# Patient Record
Sex: MALE | Race: WHITE | NOT HISPANIC OR LATINO | Employment: FULL TIME | URBAN - METROPOLITAN AREA
[De-identification: names, ages, dates, MRNs, and addresses within clinical notes are randomized per-mention and may not be internally consistent; named-entity substitution may affect disease eponyms.]

---

## 2017-03-13 RX ORDER — LISINOPRIL 10 MG/1
10 TABLET ORAL DAILY
Qty: 90 TAB | Refills: 0 | Status: SHIPPED | OUTPATIENT
Start: 2017-03-13 | End: 2017-06-13 | Stop reason: SDUPTHER

## 2017-03-13 NOTE — TELEPHONE ENCOUNTER
Refill done.  Patient needs to re-establish with a new PCP for further refills. Please have them schedule an appointment for a new PCP.  Matt Luo M.D.

## 2017-03-13 NOTE — TELEPHONE ENCOUNTER
Was the patient seen in the last year in this department? Yes     Does patient have an active prescription for medications requested? No     Received Request Via: Pharmacy     Last seen: 06/01/2016 Manuela

## 2017-04-25 ENCOUNTER — OFFICE VISIT (OUTPATIENT)
Dept: MEDICAL GROUP | Facility: MEDICAL CENTER | Age: 32
End: 2017-04-25
Payer: COMMERCIAL

## 2017-04-25 VITALS
WEIGHT: 182 LBS | BODY MASS INDEX: 28.56 KG/M2 | DIASTOLIC BLOOD PRESSURE: 72 MMHG | SYSTOLIC BLOOD PRESSURE: 138 MMHG | OXYGEN SATURATION: 99 % | RESPIRATION RATE: 16 BRPM | TEMPERATURE: 99.7 F | HEART RATE: 95 BPM | HEIGHT: 67 IN

## 2017-04-25 DIAGNOSIS — I10 ESSENTIAL HYPERTENSION: ICD-10-CM

## 2017-04-25 DIAGNOSIS — Z13.6 SCREENING FOR CARDIOVASCULAR CONDITION: ICD-10-CM

## 2017-04-25 DIAGNOSIS — Z00.00 WELLNESS EXAMINATION: ICD-10-CM

## 2017-04-25 PROCEDURE — 99395 PREV VISIT EST AGE 18-39: CPT | Performed by: PHYSICIAN ASSISTANT

## 2017-04-25 ASSESSMENT — PATIENT HEALTH QUESTIONNAIRE - PHQ9: CLINICAL INTERPRETATION OF PHQ2 SCORE: 0

## 2017-04-25 NOTE — Clinical Note
[facility]  Anderina Chang PA-C  [address]  [place] [address]  Fax: [phone]   Authorization for Release/Disclosure of   Protected Health Information   Name: LINSEY MORENO : 1985 SSN: [id number]   Address: [address] [place] [address]  [place] [address] Phone:    [phone] (home)    I authorize the entity listed below to release/disclose the PHI below to:   [facility]/Andreina Chang PA-C and Andreina Chang PA-C   Provider or Entity Name:     Address   City, State, Zip   Phone:      Fax:     Reason for request: continuity of care   Information to be released:    [  ] LAST COLONOSCOPY,  including any PATH REPORT and follow-up  [  ] LAST FIT/COLOGUARD RESULT [  ] LAST DEXA  [  ] LAST MAMMOGRAM  [  ] LAST PAP  [  ] LAST LABS [  ] RETINA EXAM REPORT  [  ] IMMUNIZATION RECORDS  [  ] Release all info      [  ] Check here and initial the line next to each item to release ALL health information INCLUDING  _____ Care and treatment for drug and / or alcohol abuse  _____ HIV testing, infection status, or AIDS  _____ Genetic Testing    DATES OF SERVICE OR TIME PERIOD TO BE DISCLOSED: _____________  I understand and acknowledge that:  * This Authorization may be revoked at any time by you in writing, except if your health information has already been used or disclosed.  * Your health information that will be used or disclosed as a result of you signing this authorization could be re-disclosed by the recipient. If this occurs, your re-disclosed health information may no longer be protected by State or Federal laws.  * You may refuse to sign this Authorization. Your refusal will not affect your ability to obtain treatment.  * This Authorization becomes effective upon signing and will  on (date) __________.      If no date is indicated, this Authorization will  one (1) year from the signature date.    Name: Linsey Moreno    Signature:   Date:     2017       PLEASE FAX  REQUESTED RECORDS BACK TO: [phone]

## 2017-04-25 NOTE — MR AVS SNAPSHOT
"        Jax Rosemew   2017 11:00 AM   Office Visit   MRN: [id number]    Department:  [facility]   Dept Phone:  [phone]    Description:  Male : 1985   Provider:  Andreina Chang PA-C           Reason for Visit     Establish Care     Annual Exam Due for labs, general wellness exam      Allergies as of 2017     No Known Allergies      You were diagnosed with     Screening for cardiovascular condition   [300034]       Wellness examination   [8700572]         Vital Signs     Blood Pressure Pulse Temperature Respirations Height Weight    138/72 mmHg 95 37.6 °C (99.7 °F) 16 1.702 m (5' 7.01\") 82.555 kg (182 lb)    Body Mass Index Oxygen Saturation Smoking Status             28.50 kg/m2 99% Former Smoker         Basic Information     Date Of Birth Sex Race Ethnicity Preferred Language    1985 Male White Non- English      Problem List              ICD-10-CM Priority Class Noted - Resolved    History of Lyme disease Z86.19   2014 - Present    Essential hypertension I10   2015 - Present    Insomnia G47.00   2016 - Present    History of tobacco abuse Z87.891   2016 - Present    Vitamin D deficiency E55.9   2016 - Present    Epistaxis R04.0   10/4/2016 - Present      Health Maintenance        Date Due Completion Dates    IMM DTaP/Tdap/Td Vaccine (2 - Td) 2024            Current Immunizations     Tdap Vaccine 2014      Below and/or attached are the medications your provider expects you to take. Review all of your home medications and newly ordered medications with your provider and/or pharmacist. Follow medication instructions as directed by your provider and/or pharmacist. Please keep your medication list with you and share with your provider. Update the information when medications are discontinued, doses are changed, or new medications (including over-the-counter products) are added; and carry medication information at all times in the " event of emergency situations     Allergies:  No Known Allergies          Medications  Valid as of: April 25, 2017 - 11:13 AM    Generic Name Brand Name Tablet Size Instructions for use    Ergocalciferol (Cap) DRISDOL 39541 UNIT Take 1 Cap by mouth every 7 days.        Lisinopril (Tab) PRINIVIL 10 MG Take 1 Tab by mouth every day.        Mupirocin (Ointment) BACTROBAN 2 % Apply 1 Application to affected area(s) every day.        .                 Medicines prescribed today were sent to:     [facility] [address] [place] [address] [place], [place] - [address] AT [facility] [place]    [address] [place] [address]    Phone: [phone] Fax: [phone]    Open 24 Hours?: No      Medication refill instructions:       If your prescription bottle indicates you have medication refills left, it is not necessary to call your provider’s office. Please contact your pharmacy and they will refill your medication.    If your prescription bottle indicates you do not have any refills left, you may request refills at any time through one of the following ways: The online ViaBill system (except Urgent Care), by calling your provider’s office, or by asking your pharmacy to contact your provider’s office with a refill request. Medication refills are processed only during regular business hours and may not be available until the next business day. Your provider may request additional information or to have a follow-up visit with you prior to refilling your medication.   *Please Note: Medication refills are assigned a new Rx number when refilled electronically. Your pharmacy may indicate that no refills were authorized even though a new prescription for the same medication is available at the pharmacy. Please request the medicine by name with the pharmacy before contacting your provider for a refill.        Your To Do List     Future Labs/Procedures Complete By Expires    LIPID PROFILE  As directed 4/25/2018         ViaBill Access  Code: [email or web address]  Expires: 5/18/2017 12:19 PM    [employer]  A secure, online tool to manage your health information     [employer]’s [employer]® is a secure, online tool that connects you to your personalized health information from the privacy of your home -- day or night - making it very easy for you to manage your healthcare. Once the activation process is completed, you can even access your medical information using the [employer] anusha, which is available for free in the Apple Anusha store or Google Play store.     [employer] provides the following levels of access (as shown below):   My Chart Features   [facility] Primary Care Doctor [facility]  Specialists [facility]  Urgent  Care Non-[facility]  Primary Care  Doctor   Email your healthcare team securely and privately 24/7 X X X    Manage appointments: schedule your next appointment; view details of past/upcoming appointments X      Request prescription refills. X      View recent personal medical records, including lab and immunizations X X X X   View health record, including health history, allergies, medications X X X X   Read reports about your outpatient visits, procedures, consult and ER notes X X X X   See your discharge summary, which is a recap of your hospital and/or ER visit that includes your diagnosis, lab results, and care plan. X X       How to register for [employer]:  1. Go to  [email or web address].  2. Click on the Sign Up Now box, which takes you to the New Member Sign Up page. You will need to provide the following information:  a. Enter your [employer] Access Code exactly as it appears at the top of this page. (You will not need to use this code after you’ve completed the sign-up process. If you do not sign up before the expiration date, you must request a new code.)   b. Enter your date of birth.   c. Enter your home email address.   d. Click Submit, and follow the next screen’s instructions.  3. Create a [employer] ID. This will be your [employer] login ID and  cannot be changed, so think of one that is secure and easy to remember.  4. Create a [employer] password. You can change your password at any time.  5. Enter your Password Reset Question and Answer. This can be used at a later time if you forget your password.   6. Enter your e-mail address. This allows you to receive e-mail notifications when new information is available in [employer].  7. Click Sign Up. You can now view your health information.    For assistance activating your [employer] account, call [phone]

## 2017-04-25 NOTE — PROGRESS NOTES
Chief Complaint:     Jax Yang is a 31 y.o. male who presents for a general exam. Doing well. No complaints. Recently had labs, PE and CXR with work. All normal. Will have records sent.    Last colonoscopy:n/a  Last Td:  Up to date  Regular exercise: yes  Not smoking  Alcohol: 5 cans of beer per week    He  has a past medical history of Lyme disease (2012) and Tobacco abuse (11/18/2014).  He  has past surgical history that includes appendectomy.  Family History   Problem Relation Age of Onset   • Cancer Father      lung, mets age 56   • Hypertension Mother    • Hypertension Father    • Hypertension Brother      Social History   Substance Use Topics   • Smoking status: Former Smoker -- 0.25 packs/day for 8 years     Types: Cigarettes     Quit date: 04/25/2015   • Smokeless tobacco: Never Used      Comment: quit Dec 2015   • Alcohol Use: 3.0 oz/week     5 Cans of beer per week       Current Outpatient Prescriptions   Medication Sig Dispense Refill   • lisinopril (PRINIVIL) 10 MG Tab Take 1 Tab by mouth every day. 90 Tab 0   • mupirocin (BACTROBAN) 2 % Ointment Apply 1 Application to affected area(s) every day. 1 Tube 0   • ergocalciferol (DRISDOL) 77578 UNIT capsule Take 1 Cap by mouth every 7 days. 12 Cap 0     No current facility-administered medications for this visit.    (including changes today)  Allergies: Review of patient's allergies indicates no known allergies.    Review Of Systems  Denies fever, chills, or sweats  Skin: negative for rash, scaling, itching, pigmentation, hair or nail changes.  Eyes: negative for visual blurring, double vision, eye pain, floaters and discharge from eyes  Ears/Nose/Throat: negative for tinnitus, vertigo, bleeding gums, dental problem and hoarseness, frequent URI's, sinus trouble, persistent sore throat  Respiratory: negative for persistent cough, hemoptysis, dyspnea, recurrent pneumonia or bronchitis, asthma and wheezing  Cardiovascular: negative for palpitations,  "tachycardia, irregular heart beat, chest pain, or peripheral edema.  Gastrointestinal: negative for poor appetite, dysphagia, nausea, heartburn or reflux, abdominal pain, hemorrhoids, constipation or diarrhea  Genitourinary: negative for nocturia, dysuria, frequency, hesitancy, incontinence,   Musculoskeletal: negative for joint swelling and muscle pain/ soreness  Neurologic: negative for migraine headaches, involuntary movements or tremor  Psychiatric: negative for anxiety or depression  Hematologic/Lymphatic/Immunologic: negative for anemia, unusual bruising, swollen glands,   Endocrine: negative for temperature intolerance, polydipsia, polyuria, unintentional weight changes.       PHYSICAL EXAMINATION:  Blood pressure 138/72, pulse 95, temperature 37.6 °C (99.7 °F), resp. rate 16, height 1.702 m (5' 7.01\"), weight 82.555 kg (182 lb), SpO2 99 %.  Body mass index is 28.5 kg/(m^2).  Wt Readings from Last 4 Encounters:   04/25/17 82.555 kg (182 lb)   10/04/16 79.379 kg (175 lb)   06/01/16 78.926 kg (174 lb)   04/19/16 75.297 kg (166 lb)       Constitutional: Alert, no distress.  Skin: Warm, dry, good turgor, no rashes in visible areas.  Eye: Equal, round and reactive, conjunctiva clear, lids normal.  ENMT: Lips without lesions, good dentition, oropharynx clear.  Neck: Trachea midline, no masses, no thyromegaly.  Respiratory: Unlabored respiratory effort, lungs clear to auscultation, no wheezes, no ronchi.  Cardiovascular: Normal S1, S2, no murmur, no edema.  Abdomen: Soft, non-tender, no masses, no hepatosplenomegaly.  Psych: Alert and oriented x3, normal affect and mood.    ASSESSMENT/PLAN:  HTN, stable  Labs per orders  Counseling about diet, exercise, skin care  Next office visit for recheck of chronic medical conditions is due in  1 year  "

## 2017-06-13 RX ORDER — LISINOPRIL 10 MG/1
TABLET ORAL
Qty: 90 TAB | Refills: 3 | Status: SHIPPED | OUTPATIENT
Start: 2017-06-13 | End: 2017-08-15 | Stop reason: SDUPTHER

## 2017-08-15 RX ORDER — LISINOPRIL 10 MG/1
10 TABLET ORAL
Qty: 90 TAB | Refills: 3 | Status: SHIPPED | OUTPATIENT
Start: 2017-08-15 | End: 2018-09-04 | Stop reason: SDUPTHER

## 2018-02-01 ENCOUNTER — OFFICE VISIT (OUTPATIENT)
Dept: MEDICAL GROUP | Facility: MEDICAL CENTER | Age: 33
End: 2018-02-01
Payer: COMMERCIAL

## 2018-02-01 VITALS
HEART RATE: 92 BPM | DIASTOLIC BLOOD PRESSURE: 70 MMHG | OXYGEN SATURATION: 96 % | HEIGHT: 67 IN | SYSTOLIC BLOOD PRESSURE: 122 MMHG | RESPIRATION RATE: 16 BRPM | BODY MASS INDEX: 29.16 KG/M2 | WEIGHT: 185.8 LBS

## 2018-02-01 DIAGNOSIS — F51.01 PRIMARY INSOMNIA: ICD-10-CM

## 2018-02-01 DIAGNOSIS — M25.532 LEFT WRIST PAIN: ICD-10-CM

## 2018-02-01 PROCEDURE — 99214 OFFICE O/P EST MOD 30 MIN: CPT | Performed by: PHYSICIAN ASSISTANT

## 2018-02-01 RX ORDER — ZOLPIDEM TARTRATE 10 MG/1
10 TABLET ORAL NIGHTLY PRN
Qty: 20 TAB | Refills: 0 | Status: SHIPPED | OUTPATIENT
Start: 2018-02-01 | End: 2018-02-21

## 2018-02-01 NOTE — ASSESSMENT & PLAN NOTE
Currently well controlled. Going to [place] and [place] in April and would like a prescription of Ambien for the trip just in case. He has tried this medication in the past without adverse reaction. Understands not to combine with any other sedating medication or with alcohol.

## 2018-02-01 NOTE — PROGRESS NOTES
"Subjective:   Jax Yang is a 32 y.o. male here today for left wrist pain and insomnia    Left wrist pain  3 months. Ulnar aspect. No injury. No swelling. Brace didn't help. Left handed. Would like to see a sports medicine doctor for evaluation.    Insomnia  Currently well controlled. Going to [place] and [place] in April and would like a prescription of Ambien for the trip just in case. He has tried this medication in the past without adverse reaction. Understands not to combine with any other sedating medication or with alcohol.       Current medicines (including changes today)  Current Outpatient Prescriptions   Medication Sig Dispense Refill   • zolpidem (AMBIEN) 10 MG Tab Take 1 Tab by mouth at bedtime as needed for Sleep for up to 20 days. 20 Tab 0   • lisinopril (PRINIVIL) 10 MG Tab Take 1 Tab by mouth every day. TAKE 1 TAB BY MOUTH EVERY DAY. 90 Tab 3   • ergocalciferol (DRISDOL) 67820 UNIT capsule Take 1 Cap by mouth every 7 days. 12 Cap 0   • mupirocin (BACTROBAN) 2 % Ointment Apply 1 Application to affected area(s) every day. 1 Tube 0     No current facility-administered medications for this visit.      He  has a past medical history of Lyme disease (2012) and Tobacco abuse (11/18/2014).    ROS   No fever/chills. No weight change. No headache/dizziness. No focal weakness. No sore throat, nasal congestion, ear pain. No chest pain, no shortness of breath, difficulty breathing. No n/v/d/c or abdominal pain. No urinary complaint. No rash or skin lesion. No joint pain or swelling.       Objective:     Blood pressure 122/70, pulse 92, resp. rate 16, height 1.702 m (5' 7\"), weight 84.3 kg (185 lb 12.8 oz), SpO2 96 %. Body mass index is 29.1 kg/m².   Physical Exam:  Constitutional: WDWN, NAD  Skin: Warm, dry, good turgor, no rashes in visible areas.  Psych: Alert and oriented x3, normal affect and mood.        Assessment and Plan:   The following treatment plan was discussed    1. Left wrist pain    - " REFERRAL TO SPORTS MEDICINE    2. Primary insomnia  Risk/benefit and potential side effects discussed.  - zolpidem (AMBIEN) 10 MG Tab; Take 1 Tab by mouth at bedtime as needed for Sleep for up to 20 days.  Dispense: 20 Tab; Refill: 0      Followup: prn

## 2018-02-01 NOTE — ASSESSMENT & PLAN NOTE
3 months. Ulnar aspect. No injury. No swelling. Brace didn't help. Left handed. Would like to see a sports medicine doctor for evaluation.

## 2018-02-20 ENCOUNTER — OFFICE VISIT (OUTPATIENT)
Dept: MEDICAL GROUP | Facility: CLINIC | Age: 33
End: 2018-02-20
Payer: COMMERCIAL

## 2018-02-20 ENCOUNTER — APPOINTMENT (OUTPATIENT)
Dept: RADIOLOGY | Facility: IMAGING CENTER | Age: 33
End: 2018-02-20
Attending: FAMILY MEDICINE
Payer: COMMERCIAL

## 2018-02-20 VITALS
SYSTOLIC BLOOD PRESSURE: 122 MMHG | HEART RATE: 77 BPM | RESPIRATION RATE: 16 BRPM | TEMPERATURE: 97.8 F | DIASTOLIC BLOOD PRESSURE: 78 MMHG | WEIGHT: 185.8 LBS | BODY MASS INDEX: 29.16 KG/M2 | OXYGEN SATURATION: 97 % | HEIGHT: 67 IN

## 2018-02-20 DIAGNOSIS — M25.532 LEFT WRIST PAIN: ICD-10-CM

## 2018-02-20 PROCEDURE — 73110 X-RAY EXAM OF WRIST: CPT | Mod: TC,LT | Performed by: FAMILY MEDICINE

## 2018-02-20 PROCEDURE — 99203 OFFICE O/P NEW LOW 30 MIN: CPT | Performed by: FAMILY MEDICINE

## 2018-02-20 ASSESSMENT — ENCOUNTER SYMPTOMS
DIZZINESS: 0
HEADACHES: 0
VOMITING: 0
FEVER: 0
SHORTNESS OF BREATH: 0
NAUSEA: 0
CHILLS: 0

## 2018-02-20 NOTE — PROGRESS NOTES
Subjective:      Jax Yang is a 32 y.o. male who presents with Wrist Pain (Referral from PCP/ L wrist pain )       Referred by Andreina Chang P.A.-C for evaluation of LEFT wrist pain    HPI   LEFT ulnar-sided wrist pain (left-handed dominant)  Insidious onset, began approximately 2 months ago (January 2018)  Throbbing pain  No radiation  Worse with radial deviation while weight lifting, but also has intermittent pain throughout the day with lifting other items  Tried taking 2 weeks off of weight lifting and did not notice any improvement  Has been using elastic wrist wrap with some improvement  Has also tried nighttime splinting which actually made his symptoms worse  Not currently taking medication for pain  Does not recall any specific remote injury  No noticeable swelling  Seen by primary care provider  No x-rays have been obtained    Review of Systems   Constitutional: Negative for chills and fever.   Respiratory: Negative for shortness of breath.    Cardiovascular: Negative for chest pain.   Gastrointestinal: Negative for nausea and vomiting.   Neurological: Negative for dizziness and headaches.      PMH:  has a past medical history of Lyme disease (2012) and Tobacco abuse (11/18/2014).  MEDS:   Current Outpatient Prescriptions:   •  zolpidem (AMBIEN) 10 MG Tab, Take 1 Tab by mouth at bedtime as needed for Sleep for up to 20 days., Disp: 20 Tab, Rfl: 0  •  lisinopril (PRINIVIL) 10 MG Tab, Take 1 Tab by mouth every day. TAKE 1 TAB BY MOUTH EVERY DAY., Disp: 90 Tab, Rfl: 3  •  mupirocin (BACTROBAN) 2 % Ointment, Apply 1 Application to affected area(s) every day., Disp: 1 Tube, Rfl: 0  •  ergocalciferol (DRISDOL) 16803 UNIT capsule, Take 1 Cap by mouth every 7 days., Disp: 12 Cap, Rfl: 0  ALLERGIES: No Known Allergies  SURGHX:   Past Surgical History:   Procedure Laterality Date   • APPENDECTOMY       SOCHX:  reports that he quit smoking about 2 years ago. His smoking use included Cigarettes. He has a  "2.00 pack-year smoking history. He has never used smokeless tobacco. He reports that he drinks about 3.0 oz of alcohol per week . He reports that he does not use drugs.  FH: Family history was reviewed, no pertinent findings to report     Objective:     /78   Pulse 77   Temp 36.6 °C (97.8 °F)   Resp 16   Ht 1.702 m (5' 7\")   Wt 84.3 kg (185 lb 12.8 oz)   SpO2 97%   BMI 29.10 kg/m²       Physical Exam      BILATERAL ELBOW EXAM:  Range of motion intact  No tenderness of the medial or lateral epicondyle  Full range of motion without pain with pronation and supination    BILATERAL WRIST EXAM:  Range of motion normal bilaterally  There is POSITIVE tenderness of the TFCC insertion without tenderness of the scaphoid, distal radius or distal ulna  POSITIVE pain with passive or active radial deviation of the LEFT wrist compared to the right  There is no deformity  There is no notable swelling  Negative Shuck test  NO pain with resisted wrist extension     The hands are otherwise neurovascularly intact       Assessment/Plan:     1. Left wrist pain  DX-WRIST-COMPLETE 3+ LEFT    REFERRAL TO OCCUPATIONAL THERAPY Reason for Therapy: Eval/Treat/Report     Suspect TFCC injury  If he worsens or fails to improve with OT, consider MRI WITH arthrogram for evaluation of the LEFT TFCC    2/20/2018 8:23 AM    HISTORY/REASON FOR EXAM:  Atraumatic Pain/Swelling/Deformity      TECHNIQUE/EXAM DESCRIPTION AND NUMBER OF VIEWS:  4 views of the LEFT wrist.    COMPARISON:  None    FINDINGS:  No acute fracture, malalignment, or soft tissue abnormality.   Impression       No acute findings or significant arthropathy identified     Interpreted in the office today with the patient     Thank you Andreina Chang P.A.-C for allowing me to participate in caring for your patient.  "

## 2018-04-26 ENCOUNTER — OFFICE VISIT (OUTPATIENT)
Dept: MEDICAL GROUP | Facility: MEDICAL CENTER | Age: 33
End: 2018-04-26
Payer: COMMERCIAL

## 2018-04-26 VITALS
OXYGEN SATURATION: 94 % | RESPIRATION RATE: 16 BRPM | SYSTOLIC BLOOD PRESSURE: 130 MMHG | BODY MASS INDEX: 27.06 KG/M2 | HEART RATE: 80 BPM | WEIGHT: 172.4 LBS | DIASTOLIC BLOOD PRESSURE: 82 MMHG | HEIGHT: 67 IN

## 2018-04-26 DIAGNOSIS — M25.572 ACUTE LEFT ANKLE PAIN: ICD-10-CM

## 2018-04-26 PROCEDURE — 99213 OFFICE O/P EST LOW 20 MIN: CPT | Performed by: PHYSICIAN ASSISTANT

## 2018-04-26 ASSESSMENT — PATIENT HEALTH QUESTIONNAIRE - PHQ9: CLINICAL INTERPRETATION OF PHQ2 SCORE: 0

## 2018-04-26 NOTE — PROGRESS NOTES
"Subjective:   Jax Yang is a 32 y.o. male here today for left ankle pain    Acute left ankle pain  2 months. No known injury. Does running, very active. Rest, compression, ice. Hasn't run in 2 months. Still hurting. Even sitting it hurts. Still a little swollen. Mostly lateral. No previous injury, surgery. No noticeable instability.       Current medicines (including changes today)  Current Outpatient Prescriptions   Medication Sig Dispense Refill   • lisinopril (PRINIVIL) 10 MG Tab Take 1 Tab by mouth every day. TAKE 1 TAB BY MOUTH EVERY DAY. 90 Tab 3   • ergocalciferol (DRISDOL) 70305 UNIT capsule Take 1 Cap by mouth every 7 days. 12 Cap 0   • mupirocin (BACTROBAN) 2 % Ointment Apply 1 Application to affected area(s) every day. 1 Tube 0     No current facility-administered medications for this visit.      He  has a past medical history of Lyme disease (2012) and Tobacco abuse (11/18/2014).    ROS   No fever/chills. No weight change. No headache/dizziness. No focal weakness. No sore throat, nasal congestion, ear pain. No chest pain, no shortness of breath, difficulty breathing. No n/v/d/c or abdominal pain. No urinary complaint. No rash or skin lesion. No other joint pain or swelling     Objective:     Blood pressure 130/82, pulse 80, resp. rate 16, height 1.702 m (5' 7\"), weight 78.2 kg (172 lb 6.4 oz), SpO2 94 %. Body mass index is 27 kg/m².   Physical Exam:  Constitutional: WDWN, NAD  Skin: Warm, dry, good turgor, no rashes in visible areas.  Musc: left ankle with slight swelling over the lateral malleolus. Slight ttp 5th proximal 5th metatarsal. No instability of joint with stressing  Psych: Alert and oriented x3, normal affect and mood.        Assessment and Plan:   The following treatment plan was discussed    1. Acute left ankle pain    - DX-ANKLE 3+ VIEWS LEFT; Future  - DX-FOOT-COMPLETE 3+ LEFT; Future      Followup: Return for after imaging.  Will likely order MRI after xrays       "

## 2018-04-26 NOTE — ASSESSMENT & PLAN NOTE
2 months. No known injury. Does running, very active. Rest, compression, ice. Hasn't run in 2 months. Still hurting. Even sitting it hurts. Still a little swollen. Mostly lateral. No previous injury, surgery. No noticeable instability.

## 2018-04-27 ENCOUNTER — TELEPHONE (OUTPATIENT)
Dept: MEDICAL GROUP | Facility: MEDICAL CENTER | Age: 33
End: 2018-04-27

## 2018-04-27 ENCOUNTER — APPOINTMENT (OUTPATIENT)
Dept: RADIOLOGY | Facility: IMAGING CENTER | Age: 33
End: 2018-04-27
Attending: PHYSICIAN ASSISTANT
Payer: COMMERCIAL

## 2018-04-27 DIAGNOSIS — M25.572 CHRONIC PAIN OF LEFT ANKLE: ICD-10-CM

## 2018-04-27 DIAGNOSIS — G89.29 CHRONIC PAIN OF LEFT ANKLE: ICD-10-CM

## 2018-04-27 DIAGNOSIS — M25.572 ACUTE LEFT ANKLE PAIN: ICD-10-CM

## 2018-04-27 PROCEDURE — 73610 X-RAY EXAM OF ANKLE: CPT | Mod: TC,LT | Performed by: NURSE PRACTITIONER

## 2018-04-27 PROCEDURE — 73630 X-RAY EXAM OF FOOT: CPT | Mod: 26,LT | Performed by: NURSE PRACTITIONER

## 2018-04-27 NOTE — TELEPHONE ENCOUNTER
----- Message from Andreina Chang P.A.-C. sent at 4/27/2018  8:40 AM PDT -----  Please call patient and advise xrays reviewed and normal for foot and ankle. I am ordering MRI of ankle. He can call [phone] to schedule. He said it is ok to leave a message on his cell phone. Thanks! Andreina Chang PA-C

## 2018-04-27 NOTE — TELEPHONE ENCOUNTER
Phone Number Called: [phone] (home)     Message: Pt informed,voiced understanding. Pt provided with number to schedule for MRI.    Left Message for patient to call back: no

## 2018-04-30 ENCOUNTER — HOSPITAL ENCOUNTER (EMERGENCY)
Facility: MEDICAL CENTER | Age: 33
End: 2018-04-30
Attending: EMERGENCY MEDICINE
Payer: COMMERCIAL

## 2018-04-30 ENCOUNTER — APPOINTMENT (OUTPATIENT)
Dept: RADIOLOGY | Facility: MEDICAL CENTER | Age: 33
End: 2018-04-30
Attending: EMERGENCY MEDICINE
Payer: COMMERCIAL

## 2018-04-30 VITALS
RESPIRATION RATE: 18 BRPM | OXYGEN SATURATION: 96 % | TEMPERATURE: 100.7 F | DIASTOLIC BLOOD PRESSURE: 62 MMHG | HEART RATE: 98 BPM | SYSTOLIC BLOOD PRESSURE: 91 MMHG

## 2018-04-30 DIAGNOSIS — S61.215A LACERATION OF LEFT RING FINGER WITHOUT FOREIGN BODY WITHOUT DAMAGE TO NAIL, INITIAL ENCOUNTER: ICD-10-CM

## 2018-04-30 DIAGNOSIS — R10.84 GENERALIZED ABDOMINAL PAIN: ICD-10-CM

## 2018-04-30 DIAGNOSIS — R55 SYNCOPE, UNSPECIFIED SYNCOPE TYPE: ICD-10-CM

## 2018-04-30 DIAGNOSIS — S00.83XA CONTUSION OF FACE, INITIAL ENCOUNTER: ICD-10-CM

## 2018-04-30 LAB
ALBUMIN SERPL BCP-MCNC: 4.6 G/DL (ref 3.2–4.9)
ALBUMIN/GLOB SERPL: 2.2 G/DL
ALP SERPL-CCNC: 64 U/L (ref 30–99)
ALT SERPL-CCNC: 19 U/L (ref 2–50)
AMPHET UR QL SCN: NEGATIVE
ANION GAP SERPL CALC-SCNC: 13 MMOL/L (ref 0–11.9)
APPEARANCE UR: ABNORMAL
AST SERPL-CCNC: 18 U/L (ref 12–45)
BACTERIA #/AREA URNS HPF: NEGATIVE /HPF
BARBITURATES UR QL SCN: NEGATIVE
BASOPHILS # BLD AUTO: 0.3 % (ref 0–1.8)
BASOPHILS # BLD: 0.04 K/UL (ref 0–0.12)
BENZODIAZ UR QL SCN: NEGATIVE
BILIRUB SERPL-MCNC: 0.7 MG/DL (ref 0.1–1.5)
BILIRUB UR QL STRIP.AUTO: NEGATIVE
BUN SERPL-MCNC: 18 MG/DL (ref 8–22)
BZE UR QL SCN: NEGATIVE
CALCIUM SERPL-MCNC: 9.1 MG/DL (ref 8.5–10.5)
CANNABINOIDS UR QL SCN: NEGATIVE
CHLORIDE SERPL-SCNC: 101 MMOL/L (ref 96–112)
CO2 SERPL-SCNC: 23 MMOL/L (ref 20–33)
COLOR UR: ABNORMAL
CREAT SERPL-MCNC: 0.95 MG/DL (ref 0.5–1.4)
EKG IMPRESSION: NORMAL
EOSINOPHIL # BLD AUTO: 0.2 K/UL (ref 0–0.51)
EOSINOPHIL NFR BLD: 1.3 % (ref 0–6.9)
EPI CELLS #/AREA URNS HPF: ABNORMAL /HPF
ERYTHROCYTE [DISTWIDTH] IN BLOOD BY AUTOMATED COUNT: 36.9 FL (ref 35.9–50)
GLOBULIN SER CALC-MCNC: 2.1 G/DL (ref 1.9–3.5)
GLUCOSE SERPL-MCNC: 138 MG/DL (ref 65–99)
GLUCOSE UR STRIP.AUTO-MCNC: NEGATIVE MG/DL
HCT VFR BLD AUTO: 47.9 % (ref 42–52)
HGB BLD-MCNC: 17.2 G/DL (ref 14–18)
IMM GRANULOCYTES # BLD AUTO: 0.06 K/UL (ref 0–0.11)
IMM GRANULOCYTES NFR BLD AUTO: 0.4 % (ref 0–0.9)
KETONES UR STRIP.AUTO-MCNC: ABNORMAL MG/DL
LEUKOCYTE ESTERASE UR QL STRIP.AUTO: ABNORMAL
LIPASE SERPL-CCNC: 19 U/L (ref 11–82)
LYMPHOCYTES # BLD AUTO: 2.07 K/UL (ref 1–4.8)
LYMPHOCYTES NFR BLD: 13.3 % (ref 22–41)
MAGNESIUM SERPL-MCNC: 1.7 MG/DL (ref 1.5–2.5)
MCH RBC QN AUTO: 30.1 PG (ref 27–33)
MCHC RBC AUTO-ENTMCNC: 35.9 G/DL (ref 33.7–35.3)
MCV RBC AUTO: 83.9 FL (ref 81.4–97.8)
METHADONE UR QL SCN: NEGATIVE
MICRO URNS: ABNORMAL
MONOCYTES # BLD AUTO: 0.72 K/UL (ref 0–0.85)
MONOCYTES NFR BLD AUTO: 4.6 % (ref 0–13.4)
MUCOUS THREADS #/AREA URNS HPF: ABNORMAL /HPF
NEUTROPHILS # BLD AUTO: 12.43 K/UL (ref 1.82–7.42)
NEUTROPHILS NFR BLD: 80.1 % (ref 44–72)
NITRITE UR QL STRIP.AUTO: NEGATIVE
NRBC # BLD AUTO: 0 K/UL
NRBC BLD-RTO: 0 /100 WBC
OPIATES UR QL SCN: NEGATIVE
OXYCODONE UR QL SCN: NEGATIVE
PCP UR QL SCN: NEGATIVE
PH UR STRIP.AUTO: 6.5 [PH]
PLATELET # BLD AUTO: 253 K/UL (ref 164–446)
PMV BLD AUTO: 10 FL (ref 9–12.9)
POTASSIUM SERPL-SCNC: 4 MMOL/L (ref 3.6–5.5)
PROPOXYPH UR QL SCN: NEGATIVE
PROT SERPL-MCNC: 6.7 G/DL (ref 6–8.2)
PROT UR QL STRIP: 30 MG/DL
RBC # BLD AUTO: 5.71 M/UL (ref 4.7–6.1)
RBC # URNS HPF: ABNORMAL /HPF
RBC UR QL AUTO: NEGATIVE
RENAL EPI CELLS #/AREA URNS HPF: NEGATIVE /HPF
SODIUM SERPL-SCNC: 137 MMOL/L (ref 135–145)
SP GR UR STRIP.AUTO: 1.03
TROPONIN I SERPL-MCNC: <0.01 NG/ML (ref 0–0.04)
UROBILINOGEN UR STRIP.AUTO-MCNC: 0.2 MG/DL
WBC # BLD AUTO: 15.5 K/UL (ref 4.8–10.8)
WBC #/AREA URNS HPF: ABNORMAL /HPF

## 2018-04-30 PROCEDURE — 84484 ASSAY OF TROPONIN QUANT: CPT

## 2018-04-30 PROCEDURE — 71046 X-RAY EXAM CHEST 2 VIEWS: CPT

## 2018-04-30 PROCEDURE — 85025 COMPLETE CBC W/AUTO DIFF WBC: CPT

## 2018-04-30 PROCEDURE — 80053 COMPREHEN METABOLIC PANEL: CPT

## 2018-04-30 PROCEDURE — 96361 HYDRATE IV INFUSION ADD-ON: CPT

## 2018-04-30 PROCEDURE — 81001 URINALYSIS AUTO W/SCOPE: CPT

## 2018-04-30 PROCEDURE — 700105 HCHG RX REV CODE 258: Performed by: EMERGENCY MEDICINE

## 2018-04-30 PROCEDURE — 74177 CT ABD & PELVIS W/CONTRAST: CPT

## 2018-04-30 PROCEDURE — 99285 EMERGENCY DEPT VISIT HI MDM: CPT

## 2018-04-30 PROCEDURE — 83690 ASSAY OF LIPASE: CPT

## 2018-04-30 PROCEDURE — 93005 ELECTROCARDIOGRAM TRACING: CPT | Performed by: EMERGENCY MEDICINE

## 2018-04-30 PROCEDURE — 94760 N-INVAS EAR/PLS OXIMETRY 1: CPT

## 2018-04-30 PROCEDURE — 700117 HCHG RX CONTRAST REV CODE 255: Performed by: EMERGENCY MEDICINE

## 2018-04-30 PROCEDURE — 96374 THER/PROPH/DIAG INJ IV PUSH: CPT

## 2018-04-30 PROCEDURE — 83735 ASSAY OF MAGNESIUM: CPT

## 2018-04-30 PROCEDURE — 80307 DRUG TEST PRSMV CHEM ANLYZR: CPT

## 2018-04-30 PROCEDURE — 700111 HCHG RX REV CODE 636 W/ 250 OVERRIDE (IP): Performed by: EMERGENCY MEDICINE

## 2018-04-30 RX ORDER — ONDANSETRON 2 MG/ML
4 INJECTION INTRAMUSCULAR; INTRAVENOUS ONCE
Status: COMPLETED | OUTPATIENT
Start: 2018-04-30 | End: 2018-04-30

## 2018-04-30 RX ORDER — ONDANSETRON 4 MG/1
4 TABLET, ORALLY DISINTEGRATING ORAL EVERY 6 HOURS PRN
Qty: 10 TAB | Refills: 0 | Status: SHIPPED | OUTPATIENT
Start: 2018-04-30 | End: 2018-06-23

## 2018-04-30 RX ORDER — SODIUM CHLORIDE, SODIUM LACTATE, POTASSIUM CHLORIDE, CALCIUM CHLORIDE 600; 310; 30; 20 MG/100ML; MG/100ML; MG/100ML; MG/100ML
1000 INJECTION, SOLUTION INTRAVENOUS ONCE
Status: COMPLETED | OUTPATIENT
Start: 2018-04-30 | End: 2018-04-30

## 2018-04-30 RX ADMIN — ONDANSETRON 4 MG: 2 INJECTION, SOLUTION INTRAMUSCULAR; INTRAVENOUS at 02:01

## 2018-04-30 RX ADMIN — IOHEXOL 100 ML: 350 INJECTION, SOLUTION INTRAVENOUS at 02:56

## 2018-04-30 RX ADMIN — SODIUM CHLORIDE, POTASSIUM CHLORIDE, SODIUM LACTATE AND CALCIUM CHLORIDE 1000 ML: 600; 310; 30; 20 INJECTION, SOLUTION INTRAVENOUS at 02:01

## 2018-04-30 NOTE — ED NOTES
ERP notified of pts temp, ERP stated ok to discharge pt, instruct pt to take medication for fever control at home.

## 2018-04-30 NOTE — ED NOTES
Pt and wife educated on discharge instructions, follow up and rx. Pt and wife verbalized understanding. Pt to waiting room via wheelchair.

## 2018-04-30 NOTE — ED TRIAGE NOTES
Pt had RUQ abd pain and had a syncopal event on the toilet. Pt hit head, no midline neck or back pain. +LOC. Small knot to forehead. Pt states he felt tired and cold all day. Pt take lisinopril 10mg, no recent change in medication. Pt is not on blood thinners.     Chief Complaint   Patient presents with   • Syncope     Charge RN notified of pt. Pt brought to [facility], report to Saulo QUIN[place]NA.

## 2018-04-30 NOTE — ED PROVIDER NOTES
ED Provider Note    CHIEF COMPLAINT  Chief Complaint   Patient presents with   • Syncope        [facility]    Primary care provider: Andreina Chang P.A.-C.   History obtained from: Patient and his wife  History limited by: None     Jax Yang is a 32 y.o. male who presents to the ED complaining of syncopal episode at home shortly prior to arrival. Patient states that he has been tired and cold and not feeling well all day. He was watching TV and had sharp abdominal pain and went into the bathroom to have a bowel movement. He apparently passed out because his wife heard a noise in the bathroom and found him on the floor unresponsive. She does not think the patient was passed out for very long because she heard the noise right away. Patient has been having some abdominal pain for about 2 days.  He reports that after he woke up from passing out he had 2 episodes of diarrhea. No blood noted. He has been nauseated but no vomiting. He denies dysuria. He is not sure if he had a fever at home. He reports shortness of breath due to his severe pain but denies any cough. His wife noted a small knot to the left side of his forehead from the fall and perhaps slight redness to the left side of his face. Patient also states that he chipped off a small piece of a tooth. He also broke his wedding ring and has a small superficial laceration to the left ring finger. He denies significant facial pain, headache or finger pain. He denies pain anywhere else and reports that his abdominal pain is slightly better. Denies prior history of heart problems. Patient reports that he is up-to-date with his tetanus.      REVIEW OF SYSTEMS  Please see HPI for pertinent positives/negatives.  All other systems reviewed and are negative.     PAST MEDICAL HISTORY  Past Medical History:   Diagnosis Date   • Tobacco abuse 11/18/2014   • Lyme disease 2012        SURGICAL HISTORY  Past Surgical History:   Procedure Laterality Date   • APPENDECTOMY           SOCIAL HISTORY  Social History     Social History Main Topics   • Smoking status: Former Smoker     Packs/day: 0.25     Years: 8.00     Types: Cigarettes     Quit date: 4/25/2015   • Smokeless tobacco: Never Used      Comment: quit Dec 2015   • Alcohol use 3.0 oz/week     5 Cans of beer per week   • Drug use: No   • Sexual activity: Yes     Partners: Female        FAMILY HISTORY  Family History   Problem Relation Age of Onset   • Hypertension Mother    • Cancer Father      lung, mets age 56   • Hypertension Father    • Hypertension Brother         CURRENT MEDICATIONS  Home Medications    **Home medications have not yet been reviewed for this encounter**          ALLERGIES  No Known Allergies     PHYSICAL EXAM  VITAL SIGNS: BP (!) 91/62   Pulse 98   Temp (!) 38.2 °C (100.7 °F)   Resp 18   SpO2 96%  @[email or web address][[id number]::@     Pulse ox interpretation: 96% I interpret this pulse ox as normal     Constitutional: Well developed, well nourished, alert in no apparent distress, nontoxic appearance   HENT: Small area of circular swelling to left forehead with mild tenderness to palpation without crepitus/step-off, mild redness to left maxilla without tenderness to palpation/swelling/crepitus/step-off, normocephalic, bilateral external ears normal, no hemotympanum bilaterally, oropharynx dry and clear without bruising/laceration/bleeding, airway patent, nose non TTP with no hematoma/bleeding/drainage, midface stable, no malocclusion, no periorbital swelling/bruising, no mastoid swelling/bruising   Eyes: PERRL, EOMI, conjunctiva without erythema, no discharge, no icterus   Neck: Soft and supple, trachea midline, no stridor, no swelling/bruising, no midline C-spine tenderness, no stepoffs, no LAD, no JVD, good ROM   Cardiovascular: Regular rate and rhythm, no murmurs/rubs/gallops, strong distal pulses and good perfusion   Thorax & Lungs: No respiratory distress, CTAB with equal BS bilaterally, no chest  tenderness/deformity/swelling/crepitus/bruising   Abdomen: Soft, mild diffuse tenderness to palpation, nondistended, no G/R, no bruising, normal BS, pelvis stable   Back: Normal inspection, no swelling/bruising, no midline TTP, no stepoffs, no CVAT   Extremities: No clubbing, no cyanosis, no edema, no gross deformity, good ROM at all joints, no tenderness, intact distal pulses with brisk cap refill, small superficial laceration to the dorsum of proximal phalanx of left ring finger without gross deformity/swelling/bruising/crepitus or significant tenderness palpation   Skin: Warm, dry, no pallor/cyanosis, no rash noted   Lymphatic: No lymphadenopathy noted   Neuro: A/O times 3, GCS15, no focal deficits noted, sensation intact to touch, equal strength bilateral UE/LE   Psychiatric: Cooperative, normal mood and affect, normal judgement, appropriate for clinical situation              DIAGNOSTIC STUDIES / PROCEDURES    EKG  12 Lead EKG obtained at 0113 and interpreted by me:   Rate: 75   Rhythm: Sinus rhythm   Ectopy: None  Intervals: Normal   Axis: Normal   Q Waves: None   QRS: Normal   ST segments: Normal  T Waves: Normal    Clinical Impression: Sinus rhythm without acute ST-T wave changes       LABS  All labs reviewed by me.     Results for orders placed or performed during the hospital encounter of 04/30/18   CBC WITH DIFFERENTIAL   Result Value Ref Range    WBC 15.5 (H) 4.8 - 10.8 K/uL    RBC 5.71 4.70 - 6.10 M/uL    Hemoglobin 17.2 14.0 - 18.0 g/dL    Hematocrit 47.9 42.0 - 52.0 %    MCV 83.9 81.4 - 97.8 fL    MCH 30.1 27.0 - 33.0 pg    MCHC 35.9 (H) 33.7 - 35.3 g/dL    RDW 36.9 35.9 - 50.0 fL    Platelet Count 253 164 - 446 K/uL    MPV 10.0 9.0 - 12.9 fL    Neutrophils-Polys 80.10 (H) 44.00 - 72.00 %    Lymphocytes 13.30 (L) 22.00 - 41.00 %    Monocytes 4.60 0.00 - 13.40 %    Eosinophils 1.30 0.00 - 6.90 %    Basophils 0.30 0.00 - 1.80 %    Immature Granulocytes 0.40 0.00 - 0.90 %    Nucleated RBC 0.00 /100 WBC     Neutrophils (Absolute) 12.43 (H) 1.82 - 7.42 K/uL    Lymphs (Absolute) 2.07 1.00 - 4.80 K/uL    Monos (Absolute) 0.72 0.00 - 0.85 K/uL    Eos (Absolute) 0.20 0.00 - 0.51 K/uL    Baso (Absolute) 0.04 0.00 - 0.12 K/uL    Immature Granulocytes (abs) 0.06 0.00 - 0.11 K/uL    NRBC (Absolute) 0.00 K/uL   COMP METABOLIC PANEL   Result Value Ref Range    Sodium 137 135 - 145 mmol/L    Potassium 4.0 3.6 - 5.5 mmol/L    Chloride 101 96 - 112 mmol/L    Co2 23 20 - 33 mmol/L    Anion Gap 13.0 (H) 0.0 - 11.9    Glucose 138 (H) 65 - 99 mg/dL    Bun 18 8 - 22 mg/dL    Creatinine 0.95 0.50 - 1.40 mg/dL    Calcium 9.1 8.5 - 10.5 mg/dL    AST(SGOT) 18 12 - 45 U/L    ALT(SGPT) 19 2 - 50 U/L    Alkaline Phosphatase 64 30 - 99 U/L    Total Bilirubin 0.7 0.1 - 1.5 mg/dL    Albumin 4.6 3.2 - 4.9 g/dL    Total Protein 6.7 6.0 - 8.2 g/dL    Globulin 2.1 1.9 - 3.5 g/dL    A-G Ratio 2.2 g/dL   TROPONIN   Result Value Ref Range    Troponin I <0.01 0.00 - 0.04 ng/mL   MAGNESIUM   Result Value Ref Range    Magnesium 1.7 1.5 - 2.5 mg/dL   LIPASE   Result Value Ref Range    Lipase 19 11 - 82 U/L   URINALYSIS CULTURE, IF INDICATED   Result Value Ref Range    Color DK Yellow     Character Cloudy (A)     Specific Gravity 1.027 <1.035    Ph 6.5 5.0 - 8.0    Glucose Negative Negative mg/dL    Ketones Trace (A) Negative mg/dL    Protein 30 (A) Negative mg/dL    Bilirubin Negative Negative    Urobilinogen, Urine 0.2 Negative    Nitrite Negative Negative    Leukocyte Esterase Trace (A) Negative    Occult Blood Negative Negative    Micro Urine Req Microscopic    URINE DRUG SCREEN   Result Value Ref Range    Amphetamines Urine Negative Negative    Barbiturates Negative Negative    Benzodiazepines Negative Negative    Cocaine Metabolite Negative Negative    Methadone Negative Negative    Opiates Negative Negative    Oxycodone Negative Negative    Phencyclidine -Pcp Negative Negative    Propoxyphene Negative Negative    Cannabinoid Metab Negative Negative    ESTIMATED GFR   Result Value Ref Range    GFR If African American >60 >60 mL/min/1.73 m 2    GFR If Non African American >60 >60 mL/min/1.73 m 2   URINE MICROSCOPIC (W/UA)   Result Value Ref Range    WBC 5-10 (A) /hpf    RBC 2-5 (A) /hpf    Bacteria Negative None /hpf    Epithelial Cells Many (A) /hpf    Epithelial Cells Renal Negative /hpf    Mucous Threads Many /hpf   EKG (NOW)   Result Value Ref Range    Report       [facility] Emergency Dept[facility]    Test Date:  2018  Pt Name:    LINSEY MORENO            Department: ER  MRN:        [id number]                      Room:        09  Gender:     Male                         Technician: [id number]  :        1985                   Requested By:RADAMES STEEL  Order #:    [id number]                    Reading MD: Radames Steel    Measurements  Intervals                                Axis  Rate:       75                           P:          42  IL:         152                          QRS:        53  QRSD:       86                           T:          23  QT:         364  QTc:        407    Interpretive Statements  SINUS RHYTHM  BASELINE WANDER IN LEAD(S) V6  No previous ECG available for comparison    Electronically Signed On 2018 6:28:33 PDT by Radames Steel          RADIOLOGY  The radiologist's interpretation of all radiological studies have been reviewed by me.     CT-ABDOMEN-PELVIS WITH   Final Result         1.  Fluid-filled reactive loops of small bowel with fluid-filled contents of the colon, appearance suggests diffuse enterocolitis.   2.  Scattered punctate sclerotic foci in the bilateral hips and pelvis, could represent bony islands, consider other sclerotic bony lesions with additional workup as clinically appropriate.   3.  Hepatomegaly      DX-CHEST-2 VIEWS   Final Result         1.  No acute cardiopulmonary disease.             COURSE & MEDICAL DECISION MAKING  Nursing notes, VS, PMSFHx reviewed in chart.     Differential  diagnoses considered include but are not limited to: Syncope, near syncope, hypoglycemia, Sz, vasovagal episode, dysrhythmia, dehydration, electrolyte abnormality, bleeding/anemia, trauma, appendicitis, AMI, AAA, bowel perforation/obstruction, ileus, cholecystitis/ascending cholangitis, gallstone/biliary colic, pancreatitis, PUD, gastritis, GERD, KS/renal colic, UTI/pyelo, gastroenteritis, colitis       Patient presents to ED with above complaint. EKG showed sinus rhythm without acute ischemic changes. No dysrhythmia noted during patient's ED stay. Chest x-ray without evidence of acute process. CT abdomen/pelvis with findings as above. Labs show leukocytosis but no focal evidence of infection except possible enterocolitis. Patient also with mild hyperglycemia and anion gap without evidence of acidosis. He presented with hypotension with dry mucous membranes and therefore IV fluid was initiated. His blood pressure subsequently improved.  He was given Zofran and subsequently tolerated oral fluids with no further vomiting. Findings discussed with patient and his wife. Patient noted to be in no acute distress and nontoxic in appearance. He is alert without focal or worrisome neurological deficits and patient declined significant headache or facial pain. He also declined x-ray of his finger. Suspect syncopal episode likely from vasovagal reaction to severe abdominal pain. There are no signs of acute abdomen on recheck. Discussed with patient likely viral enterocolitis. He was advised on hydration and good hygiene. He will be discharged home with prescription of Zofran to use as needed. Discussed with him importance of outpatient follow-up given his CT findings. Also discussed the patient worrisome signs and symptoms and return to ED precautions. Patient has wife verbalized understanding and agreed with plan of care with no further questions or concerns.      The patient is referred to a primary physician for blood  pressure management, diabetic screening, and for all other preventative health concerns.       FINAL IMPRESSION  1. Syncope, unspecified syncope type    2. Contusion of face, initial encounter    3. Laceration of left ring finger without foreign body without damage to nail, initial encounter    4. Generalized abdominal pain           DISPOSITION  Patient will be discharged home in stable condition.       FOLLOW UP  Andreina Chang P.A.-C.  [address]  [address]  [place] [facility] [id number] [phone]    Call today      [facility], Emergency Dept  [address] [id number] [phone]    If symptoms worsen         OUTPATIENT MEDICATIONS  Discharge Medication List as of 4/30/2018  4:22 AM      START taking these medications    Details   ondansetron (ZOFRAN ODT) 4 MG TABLET DISPERSIBLE Take 1 Tab by mouth every 6 hours as needed., Disp-10 Tab, R-0, Print Rx Paper                Electronically signed by: Dimitrios Calix, 4/30/2018 1:07 AM      Portions of this record were made with voice recognition software.  Despite my review, spelling/grammar/context errors may still remain.  Interpretation of this chart should be taken in this context.

## 2018-04-30 NOTE — DISCHARGE INSTRUCTIONS
Vasovagal Syncope, Adult  Syncope, which is commonly known as fainting or passing out, is a temporary loss of consciousness. It occurs when the blood flow to the brain is reduced. Vasovagal syncope, also called neurocardiogenic syncope, is a fainting spell that happens when blood flow to the brain is reduced because of a sudden drop in heart rate and blood pressure.  Vasovagal syncope is usually harmless. However, you can get injured if you fall during a fainting spell.  What are the causes?  This condition is caused by a drop in heart rate and blood pressure, usually in response to a trigger. Many things and situations can trigger an episode, including:  · Pain.  · Fear.  · The sight of blood. This may occur during medical procedures, such as when blood is being drawn from a vein.  · Common activities, such as coughing, swallowing, stretching, or going to the bathroom.  · Emotional stress.  · Being in a confined space.  · Prolonged standing, especially in a warm environment.  · Lack of sleep or rest.  · Not eating for a long time.  · Not drinking enough liquids.  · Recent illness.  · Drinking alcohol.  · Taking drugs that affect blood pressure, such as marijuana, cocaine, opiates, or inhalants.  What are the signs or symptoms?  Before a fainting episode, you may:  · Feel dizzy or light-headed.  · Become pale.  · Sense that you are going to faint.  · Feel like the room is spinning.  · Only see directly ahead (tunnel vision).  · Feel sick to your stomach (nauseous).  · See spots.  · Slowly lose vision.  · Hear ringing in your ears.  · Have a headache.  · Feel warm and sweaty.  · Feel a sensation of pins and needles.  During the fainting spell, you may twitch or make jerky movements. Fainting spells usually last no longer than a few minutes before you wake up. If you get up too quickly before your body can recover, you may faint again.  How is this diagnosed?  This condition is diagnosed based on your symptoms,  your medical history, and a physical exam. Tests may be done to rule out other causes of fainting. Tests may include:  · Blood tests.  · Heart tests, such as an electrocardiogram (ECG), echocardiogram, or electrophysiology study.  · A test to check your response to changes in position (tilt table test).  How is this treated?  Usually, treatment is not needed for this condition. Your health care provider may suggest ways to help prevent fainting episodes. These may include:  · Drinking additional fluids if you are exposed to a trigger.  · Sitting or lying down if you notice signs that an episode is coming.  If your fainting spells continue, your health care provider may recommend that you:  · Take medicines to prevent fainting or to help reduce further episodes of fainting.  · Do certain exercises.  · Wear compression stockings.  · Have surgery to place a pacemaker in your body (rare).  Follow these instructions at home:  · Learn to identify the signs that an episode is coming.  · Sit or lie down at the first sign of a fainting spell. If you sit down, put your head down between your legs. If you lie down, swing your legs up in the air to increase blood flow to the brain.  · Avoid hot tubs and saunas.  · Avoid standing for a long time. If you have to stand for a long time, try:  ¨ Crossing your legs.  ¨ Flexing and stretching your leg muscles.  ¨ Squatting.  ¨ Moving your legs.  ¨ Bending over.  · Drink enough fluid to keep your urine clear or pale yellow.  · Make changes to your diet that your health care provider recommends. You may be told to:  ¨ Avoid caffeine.  ¨ Eat more salt.  · Take over-the-counter and prescription medicines only as told by your health care provider.  Contact a health care provider if:  · You continue to have fainting spells despite treatment.  · You faint more often despite treatment.  · You lose consciousness for more than a few minutes.  · You faint during or after exercising or after  being startled.  · You have twitching or jerky movements for longer than a few seconds during a fainting spell.  · You have an episode of twitching or jerky movements without fainting.  Get help right away if:  · A fainting spell leads to an injury or bleeding.  · You have new symptoms that occur with the fainting spells, such as:  ¨ Shortness of breath.  ¨ Chest pain.  ¨ Irregular heartbeat.  · You twitch or make jerky movements for more than 5 minutes.  · You twitch or make jerky movements during more than one fainting spell.  This information is not intended to replace advice given to you by your health care provider. Make sure you discuss any questions you have with your health care provider.  Document Released: 12/04/2013 Document Revised: 05/31/2017 Document Reviewed: 10/15/2016  [employer] Interactive Patient Education © 2017 [employer]  Abdominal Pain (Nonspecific)  Your exam might not show the exact reason you have abdominal pain. Since there are many different causes of abdominal pain, another checkup and more tests may be needed. It is very important to follow up for lasting (persistent) or worsening symptoms. A possible cause of abdominal pain in any person who still has his or her appendix is acute appendicitis. Appendicitis is often hard to diagnose. Normal blood tests, urine tests, ultrasound, and CT scans do not completely rule out early appendicitis or other causes of abdominal pain. Sometimes, only the changes that happen over time will allow appendicitis and other causes of abdominal pain to be determined. Other potential problems that may require surgery may also take time to become more apparent. Because of this, it is important that you follow all of the instructions below.  HOME CARE INSTRUCTIONS   · Rest as much as possible.   · Do not eat solid food until your pain is gone.   · While adults or children have pain: A diet of water, weak decaffeinated tea, broth or bouillon, gelatin, oral  rehydration solutions (ORS), frozen ice pops, or ice chips may be helpful.   · When pain is gone in adults or children: Start a light diet (dry toast, crackers, applesauce, or white rice). Increase the diet slowly as long as it does not bother you. Eat no dairy products (including cheese and eggs) and no spicy, fatty, fried, or high-fiber foods.   · Use no alcohol, caffeine, or cigarettes.   · Take your regular medicines unless your caregiver told you not to.   · Take any prescribed medicine as directed.   · Only take over-the-counter or prescription medicines for pain, discomfort, or fever as directed by your caregiver. Do not give aspirin to children.   If your caregiver has given you a follow-up appointment, it is very important to keep that appointment. Not keeping the appointment could result in a permanent injury and/or lasting (chronic) pain and/or disability. If there is any problem keeping the appointment, you must call to reschedule.   SEEK IMMEDIATE MEDICAL CARE IF:   · Your pain is not gone in 24 hours.   · Your pain becomes worse, changes location, or feels different.   · You or your child has an oral temperature above 102° F (38.9° C), not controlled by medicine.   · Your baby is older than 3 months with a rectal temperature of 102° F (38.9° C) or higher.   · Your baby is 3 months old or younger with a rectal temperature of 100.4° F (38° C) or higher.   · You have shaking chills.   · You keep throwing up (vomiting) or cannot drink liquids.   · There is blood in your vomit or you see blood in your bowel movements.   · Your bowel movements become dark or black.   · You have frequent bowel movements.   · Your bowel movements stop (become blocked) or you cannot pass gas.   · You have bloody, frequent, or painful urination.   · You have yellow discoloration in the skin or whites of the eyes.   · Your stomach becomes bloated or bigger.   · You have dizziness or fainting.   · You have chest or back pain.    MAKE SURE YOU:   · Understand these instructions.   · Will watch your condition.   · Will get help right away if you are not doing well or get worse.   Document Released: 12/18/2006 Document Revised: 03/11/2013 Document Reviewed: 11/15/2010  [employer]® Patient Information ©2013 [employer].    Laceration Care, Adult  A laceration is a cut that goes through all of the layers of the skin and into the tissue that is right under the skin. Some lacerations heal on their own. Others need to be closed with stitches (sutures), staples, skin adhesive strips, or skin glue. Proper laceration care minimizes the risk of infection and helps the laceration to heal better.  HOW TO CARE FOR YOUR LACERATION  If sutures or staples were used:  · Keep the wound clean and dry.  · If you were given a bandage (dressing), you should change it at least one time per day or as told by your health care provider. You should also change it if it becomes wet or dirty.  · Keep the wound completely dry for the first 24 hours or as told by your health care provider. After that time, you may shower or bathe. However, make sure that the wound is not soaked in water until after the sutures or staples have been removed.  · Clean the wound one time each day or as told by your health care provider:  ¨ Wash the wound with soap and water.  ¨ Rinse the wound with water to remove all soap.  ¨ Pat the wound dry with a clean towel. Do not rub the wound.  · After cleaning the wound, apply a thin layer of antibiotic ointment as told by your health care provider. This will help to prevent infection and keep the dressing from sticking to the wound.  · Have the sutures or staples removed as told by your health care provider.  If skin adhesive strips were used:  · Keep the wound clean and dry.  · If you were given a bandage (dressing), you should change it at least one time per day or as told by your health care provider. You should also change it if it becomes  dirty or wet.  · Do not get the skin adhesive strips wet. You may shower or bathe, but be careful to keep the wound dry.  · If the wound gets wet, pat it dry with a clean towel. Do not rub the wound.  · Skin adhesive strips fall off on their own. You may trim the strips as the wound heals. Do not remove skin adhesive strips that are still stuck to the wound. They will fall off in time.  If skin glue was used:  · Try to keep the wound dry, but you may briefly wet it in the shower or bath. Do not soak the wound in water, such as by swimming.  · After you have showered or bathed, gently pat the wound dry with a clean towel. Do not rub the wound.  · Do not do any activities that will make you sweat heavily until the skin glue has fallen off on its own.  · Do not apply liquid, cream, or ointment medicine to the wound while the skin glue is in place. Using those may loosen the film before the wound has healed.  · If you were given a bandage (dressing), you should change it at least one time per day or as told by your health care provider. You should also change it if it becomes dirty or wet.  · If a dressing is placed over the wound, be careful not to apply tape directly over the skin glue. Doing that may cause the glue to be pulled off before the wound has healed.  · Do not pick at the glue. The skin glue usually remains in place for 5-10 days, then it falls off of the skin.  General Instructions  · Take over-the-counter and prescription medicines only as told by your health care provider.  · If you were prescribed an antibiotic medicine or ointment, take or apply it as told by your doctor. Do not stop using it even if your condition improves.  · To help prevent scarring, make sure to cover your wound with sunscreen whenever you are outside after stitches are removed, after adhesive strips are removed, or when glue remains in place and the wound is healed. Make sure to wear a sunscreen of at least 30 SPF.  · Do not  scratch or pick at the wound.  · Keep all follow-up visits as told by your health care provider. This is important.  · Check your wound every day for signs of infection. Watch for:  ¨ Redness, swelling, or pain.  ¨ Fluid, blood, or pus.  · Raise (elevate) the injured area above the level of your heart while you are sitting or lying down, if possible.  SEEK MEDICAL CARE IF:  · You received a tetanus shot and you have swelling, severe pain, redness, or bleeding at the injection site.  · You have a fever.  · A wound that was closed breaks open.  · You notice a bad smell coming from your wound or your dressing.  · You notice something coming out of the wound, such as wood or glass.  · Your pain is not controlled with medicine.  · You have increased redness, swelling, or pain at the site of your wound.  · You have fluid, blood, or pus coming from your wound.  · You notice a change in the color of your skin near your wound.  · You need to change the dressing frequently due to fluid, blood, or pus draining from the wound.  · You develop a new rash.  · You develop numbness around the wound.  SEEK IMMEDIATE MEDICAL CARE IF:  · You develop severe swelling around the wound.  · Your pain suddenly increases and is severe.  · You develop painful lumps near the wound or on skin that is anywhere on your body.  · You have a red streak going away from your wound.  · The wound is on your hand or foot and you cannot properly move a finger or toe.  · The wound is on your hand or foot and you notice that your fingers or toes look pale or bluish.     This information is not intended to replace advice given to you by your health care provider. Make sure you discuss any questions you have with your health care provider.     Document Released: 12/18/2006 Document Revised: 05/03/2016 Document Reviewed: 12/14/2015  [employer] Patient Education ©2016 [employer]      Syncope  Syncope is when you temporarily lose consciousness.  Syncope may also be called fainting or passing out. It is caused by a sudden decrease in blood flow to the brain. Even though most causes of syncope are not dangerous, syncope can be a sign of a serious medical problem. Signs that you may be about to faint include:  · Feeling dizzy or light-headed.  · Feeling nauseous.  · Seeing all white or all black in your field of vision.  · Having cold, clammy skin.  If you fainted, get medical help right away.Call your local emergency services (911 in the [place] Do not drive yourself to the hospital.  Follow these instructions at home:  Pay attention to any changes in your symptoms. Take these actions to help with your condition:  · Have someone stay with you until you feel stable.  · Do not drive, use machinery, or play sports until your health care provider says it is okay.  · Keep all follow-up visits as told by your health care provider. This is important.  · If you start to feel like you might faint, lie down right away and raise (elevate) your feet above the level of your heart. Breathe deeply and steadily. Wait until all of the symptoms have passed.  · Drink enough fluid to keep your urine clear or pale yellow.  · If you are taking blood pressure or heart medicine, get up slowly and take several minutes to sit and then stand. This can reduce dizziness.  · Take over-the-counter and prescription medicines only as told by your health care provider.  Get help right away if:  · You have a severe headache.  · You have unusual pain in your chest, abdomen, or back.  · You are bleeding from your mouth or rectum, or you have black or tarry stool.  · You have a very fast or irregular heartbeat (palpitations).  · You have pain with breathing.  · You faint once or repeatedly.  · You have a seizure.  · You are confused.  · You have trouble walking.  · You have severe weakness.  · You have vision problems.  These symptoms may represent a serious problem that is an emergency. Do not  wait to see if your symptoms will go away. Get medical help right away. Call your local emergency services (911 in the [place] Do not drive yourself to the hospital.   This information is not intended to replace advice given to you by your health care provider. Make sure you discuss any questions you have with your health care provider.  Document Released: 12/18/2006 Document Revised: 05/25/2017 Document Reviewed: 08/31/2016  [employer] Interactive Patient Education © 2017 [employer]

## 2018-05-03 ENCOUNTER — APPOINTMENT (OUTPATIENT)
Dept: RADIOLOGY | Facility: MEDICAL CENTER | Age: 33
End: 2018-05-03
Attending: PHYSICIAN ASSISTANT
Payer: COMMERCIAL

## 2018-05-03 ENCOUNTER — TELEPHONE (OUTPATIENT)
Dept: MEDICAL GROUP | Facility: MEDICAL CENTER | Age: 33
End: 2018-05-03

## 2018-05-03 DIAGNOSIS — M25.572 CHRONIC PAIN OF LEFT ANKLE: ICD-10-CM

## 2018-05-03 DIAGNOSIS — G89.29 CHRONIC PAIN OF LEFT ANKLE: ICD-10-CM

## 2018-05-03 PROCEDURE — 73721 MRI JNT OF LWR EXTRE W/O DYE: CPT | Mod: LT

## 2018-05-03 NOTE — TELEPHONE ENCOUNTER
----- Message from Andreina Chang P.A.-C. sent at 5/3/2018 12:50 PM PDT -----  Please call patient's cell. There is a inflammation (tendinopathy) of one of the tendons with a lengthwise split in the tendon. I don't think this will require a surgical correction but I advise he been seen by a foot and ankle specialist to evaluate - he may need to be in cast or walking boot to allow complete healing. Let me now if he is ok with the referral. Thanks! Andreina Chang PA-C

## 2018-05-04 ENCOUNTER — TELEPHONE (OUTPATIENT)
Dept: MEDICAL GROUP | Facility: MEDICAL CENTER | Age: 33
End: 2018-05-04

## 2018-05-04 DIAGNOSIS — S99.912A INJURY OF LEFT ANKLE, INITIAL ENCOUNTER: ICD-10-CM

## 2018-05-11 NOTE — TELEPHONE ENCOUNTER
Patient called back stating he received the message on his lab results and he has an appointment with orthopedics next week.    Patient also wanted Debby Hdz that he was in the ER last week.

## 2018-06-23 ENCOUNTER — OFFICE VISIT (OUTPATIENT)
Dept: URGENT CARE | Facility: PHYSICIAN GROUP | Age: 33
End: 2018-06-23
Payer: COMMERCIAL

## 2018-06-23 VITALS
WEIGHT: 175.8 LBS | BODY MASS INDEX: 27.59 KG/M2 | TEMPERATURE: 98.2 F | SYSTOLIC BLOOD PRESSURE: 108 MMHG | HEART RATE: 100 BPM | DIASTOLIC BLOOD PRESSURE: 64 MMHG | OXYGEN SATURATION: 94 % | RESPIRATION RATE: 16 BRPM | HEIGHT: 67 IN

## 2018-06-23 DIAGNOSIS — R07.89 CHEST TIGHTNESS: ICD-10-CM

## 2018-06-23 DIAGNOSIS — R05.8 PRODUCTIVE COUGH: ICD-10-CM

## 2018-06-23 DIAGNOSIS — J22 ACUTE LOWER RESPIRATORY TRACT INFECTION: ICD-10-CM

## 2018-06-23 PROCEDURE — 99214 OFFICE O/P EST MOD 30 MIN: CPT | Performed by: NURSE PRACTITIONER

## 2018-06-23 RX ORDER — ALBUTEROL SULFATE 90 UG/1
2 AEROSOL, METERED RESPIRATORY (INHALATION) EVERY 6 HOURS PRN
Qty: 8.5 G | Refills: 0 | Status: SHIPPED | OUTPATIENT
Start: 2018-06-23 | End: 2020-01-03

## 2018-06-23 RX ORDER — BENZONATATE 100 MG/1
100 CAPSULE ORAL 3 TIMES DAILY PRN
Qty: 60 CAP | Refills: 0 | Status: SHIPPED | OUTPATIENT
Start: 2018-06-23 | End: 2018-12-14

## 2018-06-23 RX ORDER — ZOLPIDEM TARTRATE 10 MG/1
TABLET ORAL
Refills: 0 | COMMUNITY
Start: 2018-03-26 | End: 2018-12-14

## 2018-06-23 RX ORDER — DOXYCYCLINE HYCLATE 100 MG
100 TABLET ORAL 2 TIMES DAILY
Qty: 14 TAB | Refills: 0 | Status: SHIPPED | OUTPATIENT
Start: 2018-06-23 | End: 2018-06-30

## 2018-06-23 NOTE — PROGRESS NOTES
Chief Complaint   Patient presents with   • Sore Throat     cough, phlem (minor blood), chest burning, x5 days        HISTORY OF PRESENT ILLNESS: Patient is a 32 y.o. male who presents today due to symptoms that started six days ago. Pt reports a productive cough without blood in sputum. Reports associated mild sore throat, nasal congestion, fatigue, chest tightness with cough, and body aches. Denies chest pain, shortness of breath, or wheezing. Denies h/o asthma/copd/CAP. No immunocompromise. Has tried OTC cold medications without significant relief of symptoms. His wife is ill with similar symptoms. No recent ABX use. No other aggravating or alleviating factors.     Patient Active Problem List    Diagnosis Date Noted   • Acute left ankle pain 2018   • Left wrist pain 2018   • Epistaxis 10/04/2016   • Vitamin D deficiency 2016   • Insomnia 2016   • History of tobacco abuse 2016   • Essential hypertension 2015   • History of Lyme disease 2014       Allergies:Patient has no known allergies.    Current Outpatient Prescriptions Ordered in [facility]   Medication Sig Dispense Refill   • lisinopril (PRINIVIL) 10 MG Tab Take 1 Tab by mouth every day. TAKE 1 TAB BY MOUTH EVERY DAY. 90 Tab 3   • zolpidem (AMBIEN) 10 MG Tab TK 1 T PO ONCE D HS PRF SLEEP FOR 20 DAYS  0     No current [facility]-ordered facility-administered medications on file.        Past Medical History:   Diagnosis Date   • Lyme disease    • Tobacco abuse 2014       Social History   Substance Use Topics   • Smoking status: Former Smoker     Packs/day: 0.25     Years: 8.00     Types: Cigarettes     Quit date: 2015   • Smokeless tobacco: Never Used      Comment: quit Dec 2015   • Alcohol use 3.0 oz/week     5 Cans of beer per week       Family Status   Relation Status   • Mother Alive   • Father    • Brother Alive   • Brother      Family History   Problem Relation Age of Onset   • Hypertension Mother    •  "Cancer Father      lung, mets age 56   • Hypertension Father    • Hypertension Brother        ROS:  Review of Systems   Constitutional: Positive for malaise, fatigue. Negative for weight loss and malaise.  HENT: Positive for congestion and sore throat. Negative for ear pain, nosebleeds, and neck pain.    Eyes: Negative for vision changes.   Cardiovascular: Negative for chest pain, palpitations, orthopnea and leg swelling.   Respiratory: Positive for cough and sputum production. Negative for shortness of breath and wheezing.   Gastrointestinal: Negative for abdominal pain, nausea, vomiting or diarrhea.   Skin: Negative for rash, diaphoresis.     Exam:  Blood pressure 108/64, pulse 100, temperature 36.8 °C (98.2 °F), resp. rate 16, height 1.702 m (5' 7\"), weight 79.7 kg (175 lb 12.8 oz), SpO2 94 %.  General: well-nourished, well-developed male in NAD  Head: normocephalic, atraumatic  Eyes: PERRLA, EOM within normal limits, no conjunctival injection, no scleral icterus, visual fields and acuity grossly intact.  Ears: normal shape and symmetry, no tenderness, no discharge. External canals are without any significant edema or erythema. Tympanic membranes are without any inflammation, no effusion. Gross auditory acuity is intact.  Nose: symmetrical without tenderness, mild discharge, erythema present bilateral nares.  Mouth/Throat: reasonable hygiene, no exudates or tonsillar enlargement. Erythema present.   Neck: no masses, range of motion within normal limits, no tracheal deviation.  Lymph: mild cervical adenopathy. No supraclavicular adenopathy.   Neuro: alert and oriented. Cranial nerves 1-12 grossly intact.   Cardiovascular: regular rate and rhythm without murmurs, rubs, or gallops. No edema.   Pulmonary: no distress. Chest is symmetrical with respiration, no wheezes, crackles, or rhonchi.   Musculoskeletal: appropriate muscle tone, gait is stable.  Skin: warm, dry, intact, no clubbing, no cyanosis.   Psych: " appropriate mood, affect, judgement.         Assessment/Plan:  1. Acute lower respiratory tract infection  doxycycline (VIBRAMYCIN) 100 MG Tab   2. Chest tightness  albuterol 108 (90 Base) MCG/ACT Aero Soln inhalation aerosol   3. Productive cough  doxycycline (VIBRAMYCIN) 100 MG Tab    benzonatate (TESSALON) 100 MG Cap           Discussed symptoms most likely viral, self limiting illness. Discussed natural progression and sx care. Contingent antibiotic prescription given to patient to fill upon meeting criteria of guidelines discussed. Albuterol and tessalon as needed.   Rest, increase fluids, hand and respiratory hygiene. May take OTC medications as directed for symptom relief.   Supportive care, differential diagnoses, and indications for immediate follow-up discussed with patient.   Pathogenesis of diagnosis discussed including typical length and natural progression.  Instructed to return to clinic or nearest emergency department for any change in condition, further concerns, or worsening of symptoms.  Patient states understanding of the plan of care and discharge instructions.  Instructed to make an appointment with their primary care provider in the next 3-7 days if not significantly improving and for further care.         Please note that this dictation was created using voice recognition software. I have made every reasonable attempt to correct obvious errors, but I expect that there are errors of grammar and possibly content that I did not discover before finalizing the note.      AUSTYN Oconnor.

## 2018-09-04 RX ORDER — LISINOPRIL 10 MG/1
TABLET ORAL
Qty: 90 TAB | Refills: 1 | Status: SHIPPED | OUTPATIENT
Start: 2018-09-04 | End: 2019-02-26 | Stop reason: SDUPTHER

## 2018-12-14 ENCOUNTER — OFFICE VISIT (OUTPATIENT)
Dept: MEDICAL GROUP | Facility: MEDICAL CENTER | Age: 33
End: 2018-12-14
Payer: COMMERCIAL

## 2018-12-14 VITALS
WEIGHT: 178.4 LBS | HEART RATE: 91 BPM | HEIGHT: 67 IN | RESPIRATION RATE: 16 BRPM | OXYGEN SATURATION: 96 % | BODY MASS INDEX: 28 KG/M2 | DIASTOLIC BLOOD PRESSURE: 86 MMHG | SYSTOLIC BLOOD PRESSURE: 132 MMHG

## 2018-12-14 DIAGNOSIS — R61 EXCESSIVE SWEATING: ICD-10-CM

## 2018-12-14 DIAGNOSIS — F51.01 PRIMARY INSOMNIA: ICD-10-CM

## 2018-12-14 DIAGNOSIS — I10 ESSENTIAL HYPERTENSION: ICD-10-CM

## 2018-12-14 DIAGNOSIS — Z23 NEED FOR INFLUENZA VACCINATION: ICD-10-CM

## 2018-12-14 PROBLEM — M25.532 LEFT WRIST PAIN: Status: RESOLVED | Noted: 2018-02-01 | Resolved: 2018-12-14

## 2018-12-14 PROBLEM — M25.572 ACUTE LEFT ANKLE PAIN: Status: RESOLVED | Noted: 2018-04-26 | Resolved: 2018-12-14

## 2018-12-14 PROCEDURE — 90686 IIV4 VACC NO PRSV 0.5 ML IM: CPT | Performed by: PHYSICIAN ASSISTANT

## 2018-12-14 PROCEDURE — 90471 IMMUNIZATION ADMIN: CPT | Performed by: PHYSICIAN ASSISTANT

## 2018-12-14 PROCEDURE — 99214 OFFICE O/P EST MOD 30 MIN: CPT | Mod: 25 | Performed by: PHYSICIAN ASSISTANT

## 2018-12-15 NOTE — PROGRESS NOTES
"Subjective:   Jax Yang is a 33 y.o. male here today for follow up on chronic conditions    Essential hypertension  Chronic, stable on current, no dizziness, headaches, chest pain, leg swelling    Insomnia  Doing better    Excessive sweating  Armpits, bothersome, would like to try prescription strength medication       Current medicines (including changes today)  Current Outpatient Prescriptions   Medication Sig Dispense Refill   • aluminum chloride (DRYSOL) 20 % external solution Apply once nightly until excessive sweating has stopped then continue 1-2 nights per week 1 Bottle 6   • lisinopril (PRINIVIL) 10 MG Tab TAKE 1 TABLET BY MOUTH ONCE DAILY 90 Tab 1   • albuterol 108 (90 Base) MCG/ACT Aero Soln inhalation aerosol Inhale 2 Puffs by mouth every 6 hours as needed for Shortness of Breath. 8.5 g 0     No current facility-administered medications for this visit.      He  has a past medical history of Lyme disease (2012) and Tobacco abuse (11/18/2014).    ROS   No fever/chills. No weight change. No headache/dizziness. No focal weakness. No sore throat, nasal congestion, ear pain. No chest pain, no shortness of breath, difficulty breathing. No n/v/d/c or abdominal pain. No urinary complaint. No rash or skin lesion. No joint pain or swelling.     Objective:     Blood pressure 132/86, pulse 91, resp. rate 16, height 1.702 m (5' 7\"), weight 80.9 kg (178 lb 6.4 oz), SpO2 96 %. Body mass index is 27.94 kg/m².   Physical Exam:  Constitutional: WDWN, NAD  Skin: Warm, dry, good turgor, no rashes in visible areas.  Eye: Equal, round and reactive, conjunctiva clear, lids normal.  ENMT: Lips without lesions, good dentition, oropharynx clear.  Neck: Trachea midline, no masses, no thyromegaly. No cervical or supraclavicular lymphadenopathy  Respiratory: Unlabored respiratory effort, lungs clear to auscultation, no wheezes, no ronchi.  Cardiovascular: Normal S1, S2, no murmur, no leg edema.  Psych: Alert and oriented x3, " normal affect and mood.      Assessment and Plan:   The following treatment plan was discussed    1. Excessive sweating    - aluminum chloride (DRYSOL) 20 % external solution; Apply once nightly until excessive sweating has stopped then continue 1-2 nights per week  Dispense: 1 Bottle; Refill: 6    2. Need for influenza vaccination    - Flu Quad Inj >3 Year Pre-Filled PF    3. Essential hypertension  Continue current    4. Primary insomnia        Followup: Return yearly and as needed.

## 2019-02-27 RX ORDER — LISINOPRIL 10 MG/1
TABLET ORAL
Qty: 90 TAB | Refills: 1 | Status: SHIPPED | OUTPATIENT
Start: 2019-02-27 | End: 2019-08-23 | Stop reason: SDUPTHER

## 2019-05-13 ENCOUNTER — OFFICE VISIT (OUTPATIENT)
Dept: URGENT CARE | Facility: PHYSICIAN GROUP | Age: 34
End: 2019-05-13
Payer: COMMERCIAL

## 2019-05-13 ENCOUNTER — HOSPITAL ENCOUNTER (OUTPATIENT)
Facility: MEDICAL CENTER | Age: 34
End: 2019-05-13
Attending: PHYSICIAN ASSISTANT
Payer: COMMERCIAL

## 2019-05-13 VITALS
RESPIRATION RATE: 16 BRPM | OXYGEN SATURATION: 95 % | HEART RATE: 86 BPM | DIASTOLIC BLOOD PRESSURE: 74 MMHG | HEIGHT: 67 IN | SYSTOLIC BLOOD PRESSURE: 116 MMHG | WEIGHT: 175 LBS | BODY MASS INDEX: 27.47 KG/M2 | TEMPERATURE: 98.5 F

## 2019-05-13 DIAGNOSIS — J02.9 SORE THROAT: ICD-10-CM

## 2019-05-13 DIAGNOSIS — J02.9 PHARYNGITIS, UNSPECIFIED ETIOLOGY: ICD-10-CM

## 2019-05-13 LAB
HETEROPH AB SER QL LA: NORMAL
INT CON NEG: NORMAL
INT CON NEG: NORMAL
INT CON POS: NORMAL
INT CON POS: NORMAL
S PYO AG THROAT QL: NEGATIVE

## 2019-05-13 PROCEDURE — 86308 HETEROPHILE ANTIBODY SCREEN: CPT | Performed by: PHYSICIAN ASSISTANT

## 2019-05-13 PROCEDURE — 87880 STREP A ASSAY W/OPTIC: CPT | Performed by: PHYSICIAN ASSISTANT

## 2019-05-13 PROCEDURE — 99213 OFFICE O/P EST LOW 20 MIN: CPT | Performed by: PHYSICIAN ASSISTANT

## 2019-05-13 PROCEDURE — 87070 CULTURE OTHR SPECIMN AEROBIC: CPT

## 2019-05-13 ASSESSMENT — ENCOUNTER SYMPTOMS
MYALGIAS: 1
FEVER: 0
TROUBLE SWALLOWING: 1
EYE REDNESS: 0
HOARSE VOICE: 0
VOMITING: 0
EYE DISCHARGE: 0
HEADACHES: 0
ABDOMINAL PAIN: 0
SWOLLEN GLANDS: 1
SHORTNESS OF BREATH: 0
NAUSEA: 0
COUGH: 0

## 2019-05-13 NOTE — PROGRESS NOTES
Subjective:      Jax Yang is a 33 y.o. male who presents with Pharyngitis (x 2 weeks)            Pharyngitis    This is a new problem. Episode onset: 2 weeks ago. The problem has been gradually worsening. Neither side of throat is experiencing more pain than the other. There has been no fever. The pain is moderate. Associated symptoms include swollen glands and trouble swallowing. Pertinent negatives include no abdominal pain, congestion, coughing, drooling, ear pain, headaches, hoarse voice, shortness of breath or vomiting. He has had no exposure to strep. Treatments tried: Tylenol Cold and Flu.     PMH:  has a past medical history of Lyme disease (2012) and Tobacco abuse (11/18/2014).  MEDS:   Current Outpatient Prescriptions:   •  lisinopril (PRINIVIL) 10 MG Tab, TAKE 1 TABLET BY MOUTH EVERY DAY, Disp: 90 Tab, Rfl: 1  •  aluminum chloride (DRYSOL) 20 % external solution, Apply once nightly until excessive sweating has stopped then continue 1-2 nights per week (Patient not taking: Reported on 5/13/2019), Disp: 1 Bottle, Rfl: 6  •  albuterol 108 (90 Base) MCG/ACT Aero Soln inhalation aerosol, Inhale 2 Puffs by mouth every 6 hours as needed for Shortness of Breath. (Patient not taking: Reported on 5/13/2019), Disp: 8.5 g, Rfl: 0  ALLERGIES: No Known Allergies  SURGHX:   Past Surgical History:   Procedure Laterality Date   • APPENDECTOMY       SOCHX:  reports that he quit smoking about 4 years ago. His smoking use included Cigarettes. He has a 2.00 pack-year smoking history. He has never used smokeless tobacco. He reports that he drinks about 3.0 oz of alcohol per week . He reports that he does not use drugs.  FH: Family history was reviewed, no pertinent findings to report    Review of Systems   Constitutional: Negative for fever.   HENT: Positive for trouble swallowing. Negative for congestion, drooling, ear pain and hoarse voice.    Eyes: Negative for discharge and redness.   Respiratory: Negative for  "cough and shortness of breath.    Cardiovascular: Negative for chest pain.   Gastrointestinal: Negative for abdominal pain, nausea and vomiting.   Genitourinary: Negative for dysuria.   Musculoskeletal: Positive for myalgias.   Skin: Negative for rash.   Neurological: Negative for headaches.          Objective:     /74   Pulse 86   Temp 36.9 °C (98.5 °F) (Temporal)   Resp 16   Ht 1.702 m (5' 7\")   Wt 79.4 kg (175 lb)   SpO2 95%   BMI 27.41 kg/m²      Physical Exam   Constitutional: He is oriented to person, place, and time. He appears well-developed and well-nourished. No distress.   HENT:   Head: Normocephalic and atraumatic.   Right Ear: Tympanic membrane, external ear and ear canal normal.   Left Ear: Tympanic membrane, external ear and ear canal normal.   Nose: Nose normal.   Mouth/Throat: Mucous membranes are normal. Posterior oropharyngeal erythema present. Tonsils are 1+ on the right. Tonsils are 1+ on the left. No tonsillar exudate.   Eyes: Conjunctivae and EOM are normal.   Neck: Normal range of motion. Neck supple.   Cardiovascular: Normal rate, regular rhythm and normal heart sounds.    Pulmonary/Chest: Effort normal and breath sounds normal.   Musculoskeletal: Normal range of motion. He exhibits no edema.   Neurological: He is alert and oriented to person, place, and time.   Skin: Skin is warm and dry.           Progress:  POCT Rapid Strep: Negative    POCT Mono: Negative    Throat Culture - pending       Assessment/Plan:     1. Pharyngitis   The patient's presenting symptoms and physical exam are consistent with pharyngitis.  His rapid strep test was negative today in clinic, indicating his symptoms are unlikely due to strep pharyngitis.  Will culture the patient's throat to rule out other bacterial sources.  The patient's POCT mono test was also negative, indicating his symptoms are unlikely to to mononucleosis.  Offered the patient viscous lidocaine for symptomatic relief of a sore " throat, however he declined the medication.  Recommend OTC medications for symptomatic relief.  Discussed strict return precautions with the patient, and he verbalized understanding.  Plan:  Throat Culture - pending   OTC Tylenol or Motrin for fever/discomfort.  OTC Supportive Care for Sore Throat - warm salt water gargles, sore throat lozenges, warm lemon water, and/or tea.  Drink plenty of fluids  Follow-up with PCP  Return to clinic or go to the ED if symptoms worsen or fail to improve, or if patient develops severe fever, worsening sore throat, change in voice, difficulty handling secretions, and/or shortness of breath.     Discussed plan with the patient, and he agrees to the above.

## 2019-05-16 LAB
BACTERIA SPEC RESP CULT: NORMAL
SIGNIFICANT IND 70042: NORMAL
SITE SITE: NORMAL
SOURCE SOURCE: NORMAL

## 2019-08-23 RX ORDER — LISINOPRIL 10 MG/1
TABLET ORAL
Qty: 90 TAB | Refills: 3 | Status: SHIPPED | OUTPATIENT
Start: 2019-08-23 | End: 2020-01-03

## 2019-09-11 ENCOUNTER — OFFICE VISIT (OUTPATIENT)
Dept: MEDICAL GROUP | Facility: MEDICAL CENTER | Age: 34
End: 2019-09-11
Payer: COMMERCIAL

## 2019-09-11 VITALS
TEMPERATURE: 98.3 F | DIASTOLIC BLOOD PRESSURE: 82 MMHG | OXYGEN SATURATION: 96 % | HEIGHT: 67 IN | BODY MASS INDEX: 27.4 KG/M2 | HEART RATE: 106 BPM | WEIGHT: 174.6 LBS | SYSTOLIC BLOOD PRESSURE: 116 MMHG

## 2019-09-11 DIAGNOSIS — R68.83 CHILLS: ICD-10-CM

## 2019-09-11 DIAGNOSIS — F51.01 PRIMARY INSOMNIA: ICD-10-CM

## 2019-09-11 DIAGNOSIS — Z80.0 FAMILY HISTORY OF COLON CANCER: ICD-10-CM

## 2019-09-11 PROCEDURE — 99214 OFFICE O/P EST MOD 30 MIN: CPT | Performed by: PHYSICIAN ASSISTANT

## 2019-09-11 RX ORDER — BUSPIRONE HYDROCHLORIDE 15 MG/1
15 TABLET ORAL 2 TIMES DAILY
Qty: 30 TAB | Refills: 1 | Status: SHIPPED | OUTPATIENT
Start: 2019-09-11 | End: 2020-01-03

## 2019-09-11 RX ORDER — IBUPROFEN 200 MG
400 TABLET ORAL ONCE
Status: COMPLETED | OUTPATIENT
Start: 2019-09-11 | End: 2019-09-11

## 2019-09-11 RX ADMIN — Medication 400 MG: at 11:34

## 2019-09-11 ASSESSMENT — PATIENT HEALTH QUESTIONNAIRE - PHQ9: CLINICAL INTERPRETATION OF PHQ2 SCORE: 0

## 2019-09-11 NOTE — ASSESSMENT & PLAN NOTE
Grandfather, father and uncles with colon cancer. Mom's side of family is positive for breast cancer. Having more probiotics and kombucha tea. Stomach feels irritated, sometimes urgency to have bowel movement. No blood in stool. Few months. Feels like he is more sensitive to certain foods. Trying to eliminate foods that are irritating. Discussed that patient will need to start colon cancer screening at age 40 and/or 10 years before his father/grandfather were dx with colon cancer.

## 2019-09-11 NOTE — ASSESSMENT & PLAN NOTE
Patient had TDAP yesterday and is feeling chills, body aches, stomach upset and headache. No fever. No meds tried. Discussed these are common side effects. VIS given. Advised f/u and/or ER with fever over 104, rash, severe pain or allergic reaction symptoms. Ibuprofen given in office to help with symptoms.

## 2019-09-11 NOTE — ASSESSMENT & PLAN NOTE
Having trouble sleeping during the week and on weekends. Worse if he has something to do the next day. Gets home at 1am, sometimes takes until 5 am to go to sleep. Sometimes sleeps well. Not sure if it is the job stress, having a new baby. Tried melatonin, zzquil, tylenol pm. Prefers not to take anything. Starting to feel anxious about going to sleep. Willing to try buspirone. Advised to stop all caffeine intake. Patient will f/u

## 2019-09-11 NOTE — PROGRESS NOTES
Subjective:   Jax Yang is a 33 y.o. male here today for 3 new concerns    Family history of colon cancer  Grandfather, father and uncles with colon cancer. Mom's side of family is positive for breast cancer. Having more probiotics and kombucha tea. Stomach feels irritated, sometimes urgency to have bowel movement. No blood in stool. Few months. Feels like he is more sensitive to certain foods. Trying to eliminate foods that are irritating. Discussed that patient will need to start colon cancer screening at age 41 and/or 10 years before his father/grandfather were dx with colon cancer.    Insomnia  Having trouble sleeping during the week and on weekends. Worse if he has something to do the next day. Gets home at 1am, sometimes takes until 5 am to go to sleep. Sometimes sleeps well. Not sure if it is the job stress, having a new baby. Tried melatonin, zzquil, tylenol pm. Prefers not to take anything. Starting to feel anxious about going to sleep. Willing to try buspirone. Advised to stop all caffeine intake. Patient will f/u    Chills  Patient had TDAP yesterday and is feeling chills, body aches, stomach upset and headache. No fever. No meds tried. Discussed these are common side effects. VIS given. Advised f/u and/or ER with fever over 104, rash, severe pain or allergic reaction symptoms. Ibuprofen given in office to help with symptoms.       Current medicines (including changes today)  Current Outpatient Medications   Medication Sig Dispense Refill   • busPIRone (BUSPAR) 15 MG tablet Take 1 Tab by mouth 2 times a day. 30 Tab 1   • lisinopril (PRINIVIL) 10 MG Tab TAKE 1 TABLET BY MOUTH ONCE DAILY 90 Tab 3   • aluminum chloride (DRYSOL) 20 % external solution Apply once nightly until excessive sweating has stopped then continue 1-2 nights per week (Patient not taking: Reported on 9/11/2019) 1 Bottle 6   • albuterol 108 (90 Base) MCG/ACT Aero Soln inhalation aerosol Inhale 2 Puffs by mouth every 6 hours as  "needed for Shortness of Breath. (Patient not taking: Reported on 5/13/2019) 8.5 g 0     No current facility-administered medications for this visit.      He  has a past medical history of Lyme disease (2012) and Tobacco abuse (11/18/2014).    ROS   No fever. No weight change. No headache/dizziness. No focal weakness. No sore throat, nasal congestion, ear pain. No chest pain, no shortness of breath, difficulty breathing. No n/v/d/c or abdominal pain. No urinary complaint. No rash or skin lesion. No joint redness or swelling.       Objective:     /82 (BP Location: Left arm, Patient Position: Sitting, BP Cuff Size: Adult long)   Pulse (!) 106   Temp 36.8 °C (98.3 °F)   Ht 1.702 m (5' 7\")   Wt 79.2 kg (174 lb 9.6 oz)   SpO2 96%  Body mass index is 27.35 kg/m².   Physical Exam:  Constitutional: WDWN, NAD  Skin: Warm, dry, good turgor, no rashes in visible areas.  Psych: Alert and oriented x3, normal affect and anxious mood.        Assessment and Plan:   The following treatment plan was discussed    1. Chills    - ibuprofen (MOTRIN) tablet 400 mg    2. Family history of colon cancer      3. Primary insomnia    - busPIRone (BUSPAR) 15 MG tablet; Take 1 Tab by mouth 2 times a day.  Dispense: 30 Tab; Refill: 1      Followup: No follow-ups on file.         "

## 2019-10-01 ENCOUNTER — APPOINTMENT (OUTPATIENT)
Dept: RADIOLOGY | Facility: IMAGING CENTER | Age: 34
End: 2019-10-01
Attending: PHYSICIAN ASSISTANT
Payer: COMMERCIAL

## 2019-10-01 ENCOUNTER — OFFICE VISIT (OUTPATIENT)
Dept: URGENT CARE | Facility: CLINIC | Age: 34
End: 2019-10-01
Payer: COMMERCIAL

## 2019-10-01 VITALS
OXYGEN SATURATION: 95 % | SYSTOLIC BLOOD PRESSURE: 130 MMHG | WEIGHT: 177 LBS | HEIGHT: 67 IN | DIASTOLIC BLOOD PRESSURE: 72 MMHG | RESPIRATION RATE: 18 BRPM | TEMPERATURE: 98.2 F | BODY MASS INDEX: 27.78 KG/M2 | HEART RATE: 96 BPM

## 2019-10-01 DIAGNOSIS — R10.84 GENERALIZED ABDOMINAL PAIN: ICD-10-CM

## 2019-10-01 DIAGNOSIS — K59.00 CONSTIPATION, UNSPECIFIED CONSTIPATION TYPE: ICD-10-CM

## 2019-10-01 DIAGNOSIS — R14.0 BLOATING: ICD-10-CM

## 2019-10-01 PROCEDURE — 74019 RADEX ABDOMEN 2 VIEWS: CPT | Mod: TC | Performed by: PHYSICIAN ASSISTANT

## 2019-10-01 PROCEDURE — 99214 OFFICE O/P EST MOD 30 MIN: CPT | Performed by: PHYSICIAN ASSISTANT

## 2019-10-03 ASSESSMENT — ENCOUNTER SYMPTOMS
BELCHING: 0
DIARRHEA: 0
HEARTBURN: 0
BLOOD IN STOOL: 0
FLATUS: 1
FEVER: 0
CHILLS: 0
NAUSEA: 1
CONSTIPATION: 1
VOMITING: 0
FLANK PAIN: 1
ABDOMINAL PAIN: 1

## 2019-10-03 NOTE — PROGRESS NOTES
"Subjective:      Jax Yang is a 34 y.o. male who presents with Abdominal Pain (x1wk, lower left pinching pain, radiating to back, slight nausea)          Patient is a 34-year-old male who presents to urgent care with left-sided abdominal pain \"pinching and burning in nature with sometimes radiation to the left flank for the last week.  Patient reports that he in general feels bloated and reports that symptoms are aggravated with eating.  Last bowel movement was this morning of which was a little bit less than normal for him.  Patient reports he has been utilizing MiraLAX for the last 2 days as he has had similar issues in the past with constipation however \"not this long\".  He does report history of episode approximately 18 months ago-of which patient was in the emergency room diagnosed with enterocolitis.  He reports that the pain is \"not that bad \".  He denies any blood in his stool.      Abdominal Pain   This is a new problem. The current episode started in the past 7 days. The problem occurs daily. The problem has been unchanged. The pain is located in the LLQ. The pain is at a severity of 4/10. The pain is moderate. Quality: As above. The abdominal pain does not radiate. Associated symptoms include constipation, flatus and nausea. Pertinent negatives include no belching, diarrhea, dysuria, fever, frequency, hematuria, melena or vomiting. Exacerbated by: Eating and certain positions. The pain is relieved by certain positions. Treatments tried: MiraLAX. The treatment provided no relief.       Review of Systems   Constitutional: Negative for chills and fever.   Gastrointestinal: Positive for abdominal pain, constipation, flatus and nausea. Negative for blood in stool, diarrhea, heartburn, melena and vomiting.   Genitourinary: Positive for flank pain. Negative for dysuria, frequency, hematuria and urgency.   Skin: Negative for itching and rash.   All other systems reviewed and are negative.         " "Objective:     /72 (BP Location: Left arm, Patient Position: Sitting, BP Cuff Size: Adult)   Pulse 96   Temp 36.8 °C (98.2 °F) (Temporal)   Resp 18   Ht 1.702 m (5' 7\")   Wt 80.3 kg (177 lb)   SpO2 95%   BMI 27.72 kg/m²    PMH:  has a past medical history of Lyme disease (2012) and Tobacco abuse (11/18/2014).  MEDS:   Current Outpatient Medications:   •  lisinopril (PRINIVIL) 10 MG Tab, TAKE 1 TABLET BY MOUTH ONCE DAILY, Disp: 90 Tab, Rfl: 3  •  busPIRone (BUSPAR) 15 MG tablet, Take 1 Tab by mouth 2 times a day. (Patient not taking: Reported on 10/1/2019), Disp: 30 Tab, Rfl: 1  •  aluminum chloride (DRYSOL) 20 % external solution, Apply once nightly until excessive sweating has stopped then continue 1-2 nights per week (Patient not taking: Reported on 9/11/2019), Disp: 1 Bottle, Rfl: 6  •  albuterol 108 (90 Base) MCG/ACT Aero Soln inhalation aerosol, Inhale 2 Puffs by mouth every 6 hours as needed for Shortness of Breath. (Patient not taking: Reported on 5/13/2019), Disp: 8.5 g, Rfl: 0  ALLERGIES: No Known Allergies  SURGHX:   Past Surgical History:   Procedure Laterality Date   • APPENDECTOMY       SOCHX:  reports that he quit smoking about 4 years ago. His smoking use included cigarettes. He has a 2.00 pack-year smoking history. He has never used smokeless tobacco. He reports that he drinks about 3.0 oz of alcohol per week. He reports that he does not use drugs.  FH: Family history was reviewed, no pertinent findings to report    Physical Exam   Constitutional: He is oriented to person, place, and time. He appears well-developed and well-nourished. No distress.   HENT:   Head: Normocephalic and atraumatic.   Right Ear: External ear normal.   Left Ear: External ear normal.   Mouth/Throat: Oropharynx is clear and moist. No oropharyngeal exudate.   Eyes: Pupils are equal, round, and reactive to light. Conjunctivae and EOM are normal.   Neck: Normal range of motion. Neck supple. No tracheal deviation " present.   Cardiovascular: Normal rate and regular rhythm.   No murmur heard.  Pulmonary/Chest: Effort normal and breath sounds normal. No respiratory distress.   Abdominal: Soft. Bowel sounds are normal. There is tenderness in the right upper quadrant, periumbilical area and left lower quadrant. There is no guarding.       Without any RLQ tenderness. Negative heel tap.      Musculoskeletal: Normal range of motion. He exhibits no edema.   Neurological: He is alert and oriented to person, place, and time. Coordination normal.   Skin: Skin is warm. No rash noted.   Psychiatric: He has a normal mood and affect. His behavior is normal. Judgment and thought content normal.   Vitals reviewed.            UA- clear.   XR abd:  Nonobstructive bowel gas pattern. Small to moderate amount of stool throughout the colon.  I have reviewed the x-ray and agree with the official read.    Assessment/Plan:     1. Generalized abdominal pain  - [id number] VIEWS; Future    2. Bloating  - [id number] VIEWS; Future    3. Constipation, unspecified constipation type    Patient is very well-appearing at this time and discussed x-ray-patient was tender to the right upper quadrant consistent with bowel loops and bowel gas-strongly encourage patient to increase his fiber intake, water as I do suspect a component of this may be constipation.  Patient without peritoneal signs, vomiting or fevers.  Very strict ER precautions were given in my contact information was given for patient to have emergent follow-up based on signs and symptoms discussed during the visit today.  Patient would like to trial the above of which we decided to decline further imaging however if minimal changes or worsening symptoms CT may be indicated.  Patient is very agreeable to the plan and understands.  Patient given precautionary s/sx that mandate immediate follow up and evaluation in the ED. Advised of risks of not doing so.    DDX, Supportive care, and indications for  immediate follow-up discussed with patient.    Instructed to return to clinic or nearest emergency department if we are not available for any change in condition, further concerns, or worsening of symptoms.    The patient demonstrated a good understanding and agreed with the treatment plan.    Please note that this dictation was created using voice recognition software. I have made every reasonable attempt to correct obvious errors, but I expect that there are errors of grammar and possibly content that I did not discover before finalizing the note.

## 2020-01-03 RX ORDER — LISINOPRIL 10 MG/1
10 TABLET ORAL EVERY MORNING
COMMUNITY
End: 2020-08-21

## 2020-01-06 NOTE — H&P
DATE OF SCHEDULED SURGERY:  01/07/2020    ADMITTING DIAGNOSIS:  Perforated tympanic membrane, left.    CHIEF COMPLAINT:  Hole in eardrum since 01/2019.    HISTORY OF PRESENT ILLNESS:  This 34-year-old  was working when   he caught-a-knee in his left ear canal.  It created a perforated eardrum on   the left.  He was referred to our office.  He was seen on 02/11/2019 with a   marginal perforation of the left posterior inferior tympanic membrane.  We   have followed him since that time.  We did perform paper patch myringoplasty,   but this has not healed.  He is in need of a postauricular tympanoplasty with   possible canalplasty.    REGULAR PHYSICIAN:  ELIJAH Franco and Luis Armando Estrada MD, [facility].    EMPLOYMENT:  .    HABITS:  Tobacco, none.  Alcohol, occasional drink.    MEDICATIONS:  Lisinopril for blood pressure.    SERIOUS MEDICAL PROBLEMS:  Blood pressure elevation, requiring medication.    PAST SURGICAL HISTORY:  Appendectomy without complication.    MEDICAL ALLERGIES:  None.    REVIEW OF SYSTEMS:  Negative.    PHYSICAL EXAMINATION:  GENERAL:  Revealed an alert male in no distress.  VITAL SIGNS:  Revealed height 5 feet 7 inches and weight 175 pounds.  HEENT:  Cranium is intact.  Right ear canal and drum normal.  Left ear canal   and drum revealed a prominent floor and posterior bony canal wall.  A marginal   perforation was noted posteriorly, inferiorly on the left side.  Tuning forks   confirmed audiometrics revealing minimal hearing loss in the left ear.  Nose,   mouth, and throat are negative.  NECK:  Negative.  CHEST:  Symmetrical and clear.  ABDOMEN:  Grossly normal.  GENITOURINARY AND RECTAL:  Not done.  EXTREMITIES AND NEUROLOGIC:  Negative.    DIAGNOSTIC DATA:  Hearing test revealed 250 Hz hearing at 25 dB, but otherwise   the right and left ears were essentially the same.  Very minimal hearing loss   in the left ear.  Discrimination scores are 100%  binaurally.    DIAGNOSIS:  Marginal perforation of left tympanic membrane.    RECOMMENDATIONS:  Postauricular canalplasty and tympanoplasty utilizing a   tragal perichondrium and possible fascia.  Patient may need a split thickness   skin graft for resurfacing some of the ear canal depending on the extent of   the canalplasty.  He understood and agreed.  The donor site would be the left   leg.             ____________________________________     MD DOMITILA JHAVERI / SHALA    DD:  01/06/2020 13:20:03  DT:  01/06/2020 13:45:36    D#:  [id number]  Job#:  [id number]

## 2020-01-07 ENCOUNTER — ANESTHESIA (OUTPATIENT)
Dept: SURGERY | Facility: MEDICAL CENTER | Age: 35
End: 2020-01-07
Payer: COMMERCIAL

## 2020-01-07 ENCOUNTER — ANESTHESIA EVENT (OUTPATIENT)
Dept: SURGERY | Facility: MEDICAL CENTER | Age: 35
End: 2020-01-07
Payer: COMMERCIAL

## 2020-01-07 ENCOUNTER — HOSPITAL ENCOUNTER (OUTPATIENT)
Facility: MEDICAL CENTER | Age: 35
End: 2020-01-07
Attending: SPECIALIST | Admitting: SPECIALIST
Payer: COMMERCIAL

## 2020-01-07 VITALS
OXYGEN SATURATION: 97 % | HEART RATE: 96 BPM | BODY MASS INDEX: 33.15 KG/M2 | SYSTOLIC BLOOD PRESSURE: 139 MMHG | DIASTOLIC BLOOD PRESSURE: 86 MMHG | TEMPERATURE: 98.2 F | RESPIRATION RATE: 18 BRPM | HEIGHT: 62 IN | WEIGHT: 180.12 LBS

## 2020-01-07 LAB
BUN BLD-MCNC: 19 MG/DL (ref 8–22)
CO2 BLD-SCNC: 24 MMOL/L (ref 20–33)
CREAT BLD-MCNC: 0.9 MG/DL (ref 0.5–1.4)
GLUCOSE BLD-MCNC: 81 MG/DL (ref 65–99)

## 2020-01-07 PROCEDURE — 160009 HCHG ANES TIME/MIN: Performed by: SPECIALIST

## 2020-01-07 PROCEDURE — 500425 HCHG DRESSING, EAR GLASSCOCK: Performed by: SPECIALIST

## 2020-01-07 PROCEDURE — 700105 HCHG RX REV CODE 258: Performed by: ANESTHESIOLOGY

## 2020-01-07 PROCEDURE — A9270 NON-COVERED ITEM OR SERVICE: HCPCS | Performed by: SPECIALIST

## 2020-01-07 PROCEDURE — 501445 HCHG STAPLER, SKIN DISP: Performed by: SPECIALIST

## 2020-01-07 PROCEDURE — 700102 HCHG RX REV CODE 250 W/ 637 OVERRIDE(OP): Performed by: ANESTHESIOLOGY

## 2020-01-07 PROCEDURE — 160002 HCHG RECOVERY MINUTES (STAT): Performed by: SPECIALIST

## 2020-01-07 PROCEDURE — 700111 HCHG RX REV CODE 636 W/ 250 OVERRIDE (IP): Performed by: ANESTHESIOLOGY

## 2020-01-07 PROCEDURE — 160046 HCHG PACU - 1ST 60 MINS PHASE II: Performed by: SPECIALIST

## 2020-01-07 PROCEDURE — A9270 NON-COVERED ITEM OR SERVICE: HCPCS | Performed by: ANESTHESIOLOGY

## 2020-01-07 PROCEDURE — 160036 HCHG PACU - EA ADDL 30 MINS PHASE I: Performed by: SPECIALIST

## 2020-01-07 PROCEDURE — 80047 BASIC METABLC PNL IONIZED CA: CPT

## 2020-01-07 PROCEDURE — 160048 HCHG OR STATISTICAL LEVEL 1-5: Performed by: SPECIALIST

## 2020-01-07 PROCEDURE — 85014 HEMATOCRIT: CPT

## 2020-01-07 PROCEDURE — 500380 HCHG DRAIN, PENROSE 1/4X12: Performed by: SPECIALIST

## 2020-01-07 PROCEDURE — 700111 HCHG RX REV CODE 636 W/ 250 OVERRIDE (IP): Performed by: SPECIALIST

## 2020-01-07 PROCEDURE — 700102 HCHG RX REV CODE 250 W/ 637 OVERRIDE(OP): Performed by: SPECIALIST

## 2020-01-07 PROCEDURE — 160029 HCHG SURGERY MINUTES - 1ST 30 MINS LEVEL 4: Performed by: SPECIALIST

## 2020-01-07 PROCEDURE — 160047 HCHG PACU  - EA ADDL 30 MINS PHASE II: Performed by: SPECIALIST

## 2020-01-07 PROCEDURE — 502573 HCHG PACK, ENT: Performed by: SPECIALIST

## 2020-01-07 PROCEDURE — 501838 HCHG SUTURE GENERAL: Performed by: SPECIALIST

## 2020-01-07 PROCEDURE — 700101 HCHG RX REV CODE 250

## 2020-01-07 PROCEDURE — 160041 HCHG SURGERY MINUTES - EA ADDL 1 MIN LEVEL 4: Performed by: SPECIALIST

## 2020-01-07 PROCEDURE — 700101 HCHG RX REV CODE 250: Performed by: SPECIALIST

## 2020-01-07 PROCEDURE — 160025 RECOVERY II MINUTES (STATS): Performed by: SPECIALIST

## 2020-01-07 PROCEDURE — 700105 HCHG RX REV CODE 258: Performed by: SPECIALIST

## 2020-01-07 PROCEDURE — 160035 HCHG PACU - 1ST 60 MINS PHASE I: Performed by: SPECIALIST

## 2020-01-07 PROCEDURE — 700101 HCHG RX REV CODE 250: Performed by: ANESTHESIOLOGY

## 2020-01-07 RX ORDER — MIDAZOLAM HYDROCHLORIDE 1 MG/ML
INJECTION INTRAMUSCULAR; INTRAVENOUS PRN
Status: DISCONTINUED | OUTPATIENT
Start: 2020-01-07 | End: 2020-01-07 | Stop reason: SURG

## 2020-01-07 RX ORDER — MIDAZOLAM HYDROCHLORIDE 1 MG/ML
1 INJECTION INTRAMUSCULAR; INTRAVENOUS
Status: DISCONTINUED | OUTPATIENT
Start: 2020-01-07 | End: 2020-01-07 | Stop reason: HOSPADM

## 2020-01-07 RX ORDER — CELECOXIB 200 MG/1
200 CAPSULE ORAL ONCE
Status: COMPLETED | OUTPATIENT
Start: 2020-01-07 | End: 2020-01-07

## 2020-01-07 RX ORDER — CIPROFLOXACIN AND DEXAMETHASONE 3; 1 MG/ML; MG/ML
SUSPENSION/ DROPS AURICULAR (OTIC)
Status: DISCONTINUED
Start: 2020-01-07 | End: 2020-01-07 | Stop reason: HOSPADM

## 2020-01-07 RX ORDER — OXYCODONE HCL 5 MG/5 ML
5 SOLUTION, ORAL ORAL
Status: COMPLETED | OUTPATIENT
Start: 2020-01-07 | End: 2020-01-07

## 2020-01-07 RX ORDER — EPINEPHRINE 1 MG/ML(1)
AMPUL (ML) INJECTION
Status: DISCONTINUED | OUTPATIENT
Start: 2020-01-07 | End: 2020-01-07 | Stop reason: HOSPADM

## 2020-01-07 RX ORDER — SODIUM CHLORIDE, SODIUM LACTATE, POTASSIUM CHLORIDE, CALCIUM CHLORIDE 600; 310; 30; 20 MG/100ML; MG/100ML; MG/100ML; MG/100ML
INJECTION, SOLUTION INTRAVENOUS CONTINUOUS
Status: DISCONTINUED | OUTPATIENT
Start: 2020-01-07 | End: 2020-01-07 | Stop reason: HOSPADM

## 2020-01-07 RX ORDER — BUPIVACAINE HYDROCHLORIDE AND EPINEPHRINE 5; 5 MG/ML; UG/ML
INJECTION, SOLUTION EPIDURAL; INTRACAUDAL; PERINEURAL
Status: DISCONTINUED
Start: 2020-01-07 | End: 2020-01-07 | Stop reason: HOSPADM

## 2020-01-07 RX ORDER — DIPHENHYDRAMINE HYDROCHLORIDE 50 MG/ML
12.5 INJECTION INTRAMUSCULAR; INTRAVENOUS
Status: DISCONTINUED | OUTPATIENT
Start: 2020-01-07 | End: 2020-01-07 | Stop reason: HOSPADM

## 2020-01-07 RX ORDER — LIDOCAINE HYDROCHLORIDE AND EPINEPHRINE 10; 10 MG/ML; UG/ML
INJECTION, SOLUTION INFILTRATION; PERINEURAL
Status: DISCONTINUED
Start: 2020-01-07 | End: 2020-01-07 | Stop reason: HOSPADM

## 2020-01-07 RX ORDER — MEPERIDINE HYDROCHLORIDE 25 MG/ML
6.25 INJECTION INTRAMUSCULAR; INTRAVENOUS; SUBCUTANEOUS
Status: DISCONTINUED | OUTPATIENT
Start: 2020-01-07 | End: 2020-01-07 | Stop reason: HOSPADM

## 2020-01-07 RX ORDER — ONDANSETRON 2 MG/ML
INJECTION INTRAMUSCULAR; INTRAVENOUS PRN
Status: DISCONTINUED | OUTPATIENT
Start: 2020-01-07 | End: 2020-01-07 | Stop reason: SURG

## 2020-01-07 RX ORDER — BUPIVACAINE HYDROCHLORIDE AND EPINEPHRINE 5; 5 MG/ML; UG/ML
INJECTION, SOLUTION EPIDURAL; INTRACAUDAL; PERINEURAL
Status: DISCONTINUED | OUTPATIENT
Start: 2020-01-07 | End: 2020-01-07 | Stop reason: HOSPADM

## 2020-01-07 RX ORDER — ONDANSETRON 2 MG/ML
4 INJECTION INTRAMUSCULAR; INTRAVENOUS
Status: DISCONTINUED | OUTPATIENT
Start: 2020-01-07 | End: 2020-01-07 | Stop reason: HOSPADM

## 2020-01-07 RX ORDER — LIDOCAINE HYDROCHLORIDE AND EPINEPHRINE 10; 10 MG/ML; UG/ML
INJECTION, SOLUTION INFILTRATION; PERINEURAL
Status: DISCONTINUED | OUTPATIENT
Start: 2020-01-07 | End: 2020-01-07 | Stop reason: HOSPADM

## 2020-01-07 RX ORDER — LIDOCAINE HYDROCHLORIDE 20 MG/ML
INJECTION, SOLUTION EPIDURAL; INFILTRATION; INTRACAUDAL; PERINEURAL PRN
Status: DISCONTINUED | OUTPATIENT
Start: 2020-01-07 | End: 2020-01-07 | Stop reason: SURG

## 2020-01-07 RX ORDER — CIPROFLOXACIN HYDROCHLORIDE 3.5 MG/ML
SOLUTION/ DROPS TOPICAL
Status: DISCONTINUED
Start: 2020-01-07 | End: 2020-01-07 | Stop reason: HOSPADM

## 2020-01-07 RX ORDER — ACETAMINOPHEN 500 MG
1000 TABLET ORAL ONCE
Status: COMPLETED | OUTPATIENT
Start: 2020-01-07 | End: 2020-01-07

## 2020-01-07 RX ORDER — HALOPERIDOL 5 MG/ML
1 INJECTION INTRAMUSCULAR
Status: DISCONTINUED | OUTPATIENT
Start: 2020-01-07 | End: 2020-01-07 | Stop reason: HOSPADM

## 2020-01-07 RX ORDER — EPINEPHRINE 1 MG/ML
INJECTION INTRAMUSCULAR; INTRAVENOUS; SUBCUTANEOUS
Status: DISCONTINUED
Start: 2020-01-07 | End: 2020-01-07 | Stop reason: HOSPADM

## 2020-01-07 RX ORDER — OXYCODONE HCL 5 MG/5 ML
10 SOLUTION, ORAL ORAL
Status: COMPLETED | OUTPATIENT
Start: 2020-01-07 | End: 2020-01-07

## 2020-01-07 RX ORDER — LIDOCAINE HYDROCHLORIDE 10 MG/ML
INJECTION, SOLUTION INFILTRATION; PERINEURAL
Status: COMPLETED
Start: 2020-01-07 | End: 2020-01-07

## 2020-01-07 RX ORDER — ROCURONIUM BROMIDE 10 MG/ML
INJECTION, SOLUTION INTRAVENOUS PRN
Status: DISCONTINUED | OUTPATIENT
Start: 2020-01-07 | End: 2020-01-07 | Stop reason: SURG

## 2020-01-07 RX ADMIN — SODIUM CHLORIDE, POTASSIUM CHLORIDE, SODIUM LACTATE AND CALCIUM CHLORIDE: 600; 310; 30; 20 INJECTION, SOLUTION INTRAVENOUS at 14:23

## 2020-01-07 RX ADMIN — FENTANYL CITRATE 25 MCG: 0.05 INJECTION, SOLUTION INTRAMUSCULAR; INTRAVENOUS at 14:21

## 2020-01-07 RX ADMIN — FENTANYL CITRATE 25 MCG: 0.05 INJECTION, SOLUTION INTRAMUSCULAR; INTRAVENOUS at 14:27

## 2020-01-07 RX ADMIN — ONDANSETRON 4 MG: 2 INJECTION INTRAMUSCULAR; INTRAVENOUS at 10:47

## 2020-01-07 RX ADMIN — OXYCODONE HYDROCHLORIDE 5 MG: 5 SOLUTION ORAL at 14:17

## 2020-01-07 RX ADMIN — LIDOCAINE HYDROCHLORIDE 0.5 ML: 10 INJECTION, SOLUTION INFILTRATION; PERINEURAL at 09:53

## 2020-01-07 RX ADMIN — CELECOXIB 200 MG: 200 CAPSULE ORAL at 09:53

## 2020-01-07 RX ADMIN — PROPOFOL 150 MG: 10 INJECTION, EMULSION INTRAVENOUS at 10:47

## 2020-01-07 RX ADMIN — MIDAZOLAM 2 MG: 1 INJECTION INTRAMUSCULAR; INTRAVENOUS at 10:47

## 2020-01-07 RX ADMIN — FENTANYL CITRATE 25 MCG: 50 INJECTION, SOLUTION INTRAMUSCULAR; INTRAVENOUS at 13:11

## 2020-01-07 RX ADMIN — LIDOCAINE HYDROCHLORIDE 100 MG: 20 INJECTION, SOLUTION EPIDURAL; INFILTRATION; INTRACAUDAL at 10:47

## 2020-01-07 RX ADMIN — ROCURONIUM BROMIDE 50 MG: 10 INJECTION, SOLUTION INTRAVENOUS at 10:47

## 2020-01-07 RX ADMIN — SODIUM CHLORIDE, POTASSIUM CHLORIDE, SODIUM LACTATE AND CALCIUM CHLORIDE: 600; 310; 30; 20 INJECTION, SOLUTION INTRAVENOUS at 09:53

## 2020-01-07 RX ADMIN — Medication 0.5 ML: at 09:53

## 2020-01-07 RX ADMIN — FENTANYL CITRATE 50 MCG: 0.05 INJECTION, SOLUTION INTRAMUSCULAR; INTRAVENOUS at 16:40

## 2020-01-07 RX ADMIN — ACETAMINOPHEN 1000 MG: 500 TABLET ORAL at 09:54

## 2020-01-07 RX ADMIN — FENTANYL CITRATE 25 MCG: 0.05 INJECTION, SOLUTION INTRAMUSCULAR; INTRAVENOUS at 13:59

## 2020-01-07 ASSESSMENT — PAIN SCALES - GENERAL: PAIN_LEVEL: 6

## 2020-01-07 NOTE — OR NURSING
1351-Pt in PACU from OR, report from Dr. Bradley and OR RN. VSS. [place] in place on left ear.     1359-Pt c/o headache, medicated per MAR.     1417-Pt c/o 7/10 headache/ear pain. Medicated per MAR.   Pt drinking water. VSS.     1435-Pt states headache gone, c/o pressure. Ice pack in place, head of bed elevated.     1505- wife at bedside. Going to fill prescriptions.     1515- secondary ice pack in place, pt medicated for pain    1605- Pt dressed with assistance of spouse. Went to  to void. Pt meets criteria for phase 2    1640- pt medicated for 8/10 pain    1700- spoke to kate JEROME to change [place] due to drainage. Interior cotton ball saturated with blood, unable to remove cotton ball behind ear due to staples. [place] placed. Pt tolerated well. Pt given extra gauze to take home.    1720-Discharge orders received. Meets discharge criteria at this time. Definite c/o pain in ear canal 5/10. No nausea. Tolerating PO. On RA. All lines and monitors discontinued. Reviewed discharge paperwork with spouse. Discussed diet, activity, medications, follow up care and worsening symptoms. No questions at this time. Pt to be discharged to home via private vehicle.

## 2020-01-07 NOTE — ANESTHESIA PREPROCEDURE EVALUATION
Relevant Problems   CARDIAC   (+) Essential hypertension       Physical Exam    Airway   Mallampati: II  TM distance: >3 FB  Neck ROM: full       Cardiovascular - normal exam  Rhythm: regular  Rate: normal  (-) murmur     Dental - normal exam         Pulmonary - normal exam  Breath sounds clear to auscultation     Abdominal    Neurological - normal exam                 Anesthesia Plan    ASA 2       Plan - general       Airway plan will be ETT        Induction: intravenous    Postoperative Plan: Postoperative administration of opioids is intended.    Pertinent diagnostic labs and testing reviewed    Informed Consent:    Anesthetic plan and risks discussed with patient.    Use of blood products discussed with: patient whom consented to blood products.

## 2020-01-07 NOTE — OR SURGEON
Immediate Post OP Note    PreOp Diagnosis: marginal perforation,left tympanic membrane  Left ear canal stenosis    PostOp Diagnosis: same    Procedure(s):  TYMPANOPLASTY - Wound Class: Clean Contaminated  CANALOPLASTY, EAR - Wound Class: Clean Contaminated    Surgeon(s):  Therese Vann M.D.    Anesthesiologist/Type of Anesthesia:  Anesthesiologist: Bola Bradley M.D./General    Surgical Staff:  Circulator: Pauline Ramirez R.N.  Relief Circulator: Scott Palomo R.N.  Relief Scrub: Fortunato Conklin  Scrub Person: Eugenio Enriquez    Specimens removed if any:  * No specimens in log *    Estimated Blood Loss: minimal    Findings: marginal perforation, left posterior-inferior tympanic membrane.  Left external thai canal stenosis.      Complications: none        1/7/2020 1:53 PM Therese Vann M.D.

## 2020-01-07 NOTE — ANESTHESIA TIME REPORT
Anesthesia Start and Stop Event Times     Date Time Event    1/7/2020 1005 Ready for Procedure     1043 Anesthesia Start     1353 Anesthesia Stop        Responsible Staff  01/07/20    Name Role Begin End    Bola Bradley M.D. Anesth 1043 1353        Preop Diagnosis (Free Text):  Pre-op Diagnosis     PERFORATION OF TYMPANIC MEMBRANE        Preop Diagnosis (Codes):    Post op Diagnosis  Perforation of tympanic membrane      Premium Reason  Non-Premium    Comments:

## 2020-01-07 NOTE — ANESTHESIA POSTPROCEDURE EVALUATION
Patient: Jax Yang    Procedure Summary     Date:  01/07/20 Room / Location:  [facility] ROOM 26 / SURGERY SAME DAY [facility]    Anesthesia Start:  1043 Anesthesia Stop:  1353    Procedures:       TYMPANOPLASTY (Left Ear)      CANALOPLASTY, EAR (Left Ear) Diagnosis:  (PERFORATION OF TYMPANIC MEMBRANE)    Surgeon:  Therese Vann M.D. Responsible Provider:  Bola Bradley M.D.    Anesthesia Type:  general ASA Status:  2          Final Anesthesia Type: general  Last vitals  BP   Blood Pressure: 142/89    Temp   36.8 °C (98.2 °F)    Pulse   Pulse: 95   Resp   17    SpO2   95 %      Anesthesia Post Evaluation    Patient location during evaluation: PACU  Patient participation: complete - patient participated  Level of consciousness: awake and alert  Pain score: 6    Airway patency: patent  Anesthetic complications: no  Cardiovascular status: hemodynamically stable  Respiratory status: acceptable  Hydration status: euvolemic    PONV: none           Nurse Pain Score: 6 (NPRS)

## 2020-01-07 NOTE — ANESTHESIA PROCEDURE NOTES
Airway  Date/Time: 1/7/2020 10:48 AM  Performed by: Bola Bradley M.D.  Authorized by: Bola Bradley M.D.     Location:  OR  Urgency:  Elective  Indications for Airway Management:  Anesthesia  Spontaneous Ventilation: absent    Sedation Level:  Deep  Preoxygenated: Yes    Patient Position:  Sniffing  Final Airway Type:  Endotracheal airway  Final Endotracheal Airway:  ETT  Cuffed: Yes    Technique Used for Successful ETT Placement:  Direct laryngoscopy  Insertion Site:  Oral  Blade Type:  Bassett  Laryngoscope Blade/Videolaryngoscope Blade Size:  2  ETT Size (mm):  8.0  Measured from:  Teeth  ETT to Teeth (cm):  24  Placement Verified by: auscultation and capnometry    Cormack-Lehane Classification:  Grade I - full view of glottis  Number of Attempts at Approach:  1
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
## 2020-01-07 NOTE — ANESTHESIA QCDR
2019 [facility] Clinical Data Registry (for Quality Improvement)     Postoperative nausea/vomiting risk protocol (Adult = 18 yrs and Pediatric 3-17 yrs)- (430 and 463)  General inhalation anesthetic (NOT TIVA) with PONV risk factors: No  Provision of anti-emetic therapy with at least 2 different classes of agents: N/A  Patient DID NOT receive anti-emetic therapy and reason is documented in Medical Record: N/A    Multimodal Pain Management- (477)  Non-emergent surgery AND patient age >= 18: Yes  Use of Multimodal Pain Management, two or more drugs and/or interventions, NOT including systemic opioids: Yes  Exception: Documented allergy to multiple classes of analgesics: N/A    Smoking Abstinence (404)  Patient is current smoker (cigarette, pipe, e-cig, marijuanna): No  Elective Surgery:   Abstinence instructions provided prior to day of surgery:   Patient abstained from smoking on day of surgery:     Pre-Op Beta-Blocker in Isolated CABG (44)  Isolated CABG AND patient age >= 18: No  Beta-blocker admin within 24 hours of surgical incision:   Exception:of medical reason(s) for not administering beta blocker within 24 hours prior to surgical incision (e.g., not  indicated,other medical reason):     PACU assessment of acute postoperative pain prior to Anesthesia Care End- Applies to Patients Age = 18- (ABG7)  Initial PACU pain score is which of the following: < 7/10  Patient unable to report pain score: N/A    Post-anesthetic transfer of care checklist/protocol to PACU/ICU- (426 and 427)  Upon conclusion of case, patient transferred to which of the following locations: PACU/Non-ICU  Use of transfer checklist/protocol: Yes  Exclusion: Service Performed in Patient Hospital Room (and thus did not require transfer): N/A  Unplanned admission to ICU related to anesthesia service up through end of PACU care- (MD51)  Unplanned admission to ICU (not initially anticipated at anesthesia start time): No

## 2020-01-08 LAB
CA-I BLD ISE-SCNC: 1.16 MMOL/L (ref 1.1–1.3)
CHLORIDE BLD-SCNC: 106 MMOL/L (ref 96–112)
HCT VFR BLD CALC: 48 % (ref 42–52)
HGB BLD-MCNC: 16.3 G/DL (ref 14–18)
POTASSIUM BLD-SCNC: 4.5 MMOL/L (ref 3.6–5.5)
SODIUM BLD-SCNC: 137 MMOL/L (ref 135–145)

## 2020-01-08 NOTE — DISCHARGE INSTRUCTIONS
ACTIVITY: Rest and take it easy for the first 24 hours.  A responsible adult is recommended to remain with you during that time.  It is normal to feel sleepy.  We encourage you to not do anything that requires balance, judgment or coordination.    MILD FLU-LIKE SYMPTOMS ARE NORMAL. YOU MAY EXPERIENCE GENERALIZED MUSCLE ACHES, THROAT IRRITATION, HEADACHE AND/OR SOME NAUSEA.    FOR 24 HOURS DO NOT:  Drive, operate machinery or run household appliances.  Drink beer or alcoholic beverages.   Make important decisions or sign legal documents.    SPECIAL INSTRUCTIONS: Keep glascock in place if tolerated. May use cotton balls for drainage. Dr. George will call you to follow up with you.     DIET: To avoid nausea, slowly advance diet as tolerated, avoiding spicy or greasy foods for the first day.  Add more substantial food to your diet according to your physician's instructions.  Babies can be fed formula or breast milk as soon as they are hungry.  INCREASE FLUIDS AND FIBER TO AVOID CONSTIPATION.    SURGICAL DRESSING/BATHING: Keep surgical site/ear clean and dry.     FOLLOW-UP APPOINTMENT:  A follow-up appointment should be arranged with your doctor; call to schedule.    You should CALL YOUR PHYSICIAN if you develop:  Fever greater than 101 degrees F.  Pain not relieved by medication, or persistent nausea or vomiting.  Excessive bleeding (blood soaking through dressing) or unexpected drainage from the wound.  Extreme redness or swelling around the incision site, drainage of pus or foul smelling drainage.  Inability to urinate or empty your bladder within 8 hours.  Problems with breathing or chest pain.    You should call 911 if you develop problems with breathing or chest pain.  If you are unable to contact your doctor or surgical center, you should go to the nearest emergency room or urgent care center.  Physician's telephone #: DR. GEORGE [phone].    If any questions arise, call your doctor.  If your doctor is not  available, please feel free to call the Surgical Center at [phone].  The Center is open Monday through Friday from 7AM to 7PM.  You can also call the HEALTH HOTLINE open 24 hours/day, 7 days/week and speak to a nurse at [phone], or toll free at [phone].    A registered nurse may call you a few days after your surgery to see how you are doing after your procedure.    MEDICATIONS: Resume taking daily medication.  Take prescribed pain medication with food.  If no medication is prescribed, you may take non-aspirin pain medication if needed.  PAIN MEDICATION CAN BE VERY CONSTIPATING.  Take a stool softener or laxative such as senokot, pericolace, or milk of magnesia if needed.    Prescription given for Zofran, oxycodone, cephalexin.  Last pain medication given at 2:17PM (5MG).    If your physician has prescribed pain medication that includes Acetaminophen (Tylenol), do not take additional Acetaminophen (Tylenol) while taking the prescribed medication.    Depression / Suicide Risk    As you are discharged from this [facility] facility, it is important to learn how to keep safe from harming yourself.    Recognize the warning signs:  · Abrupt changes in personality, positive or negative- including increase in energy   · Giving away possessions  · Change in eating patterns- significant weight changes-  positive or negative  · Change in sleeping patterns- unable to sleep or sleeping all the time   · Unwillingness or inability to communicate  · Depression  · Unusual sadness, discouragement and loneliness  · Talk of wanting to die  · Neglect of personal appearance   · Rebelliousness- reckless behavior  · Withdrawal from people/activities they love  · Confusion- inability to concentrate     If you or a loved one observes any of these behaviors or has concerns about self-harm, here's what you can do:  · Talk about it- your feelings and reasons for harming yourself  · Remove any means that you might use to  hurt yourself (examples: pills, rope, extension cords, firearm)  · Get professional help from the community (Mental Health, Substance Abuse, psychological counseling)  · Do not be alone:Call your Safe Contact- someone whom you trust who will be there for you.  · Call your local CRISIS HOTLINE 152-5805 or 530-237-0161  · Call your local Children's Mobile Crisis Response Team [place] (494) 850-0922 or www.IndexTank  · Call the toll free National Suicide Prevention Hotlines   · National Suicide Prevention Lifeline 028-735-NMLK (5706)  · National Hope Line Network 800-SUICIDE (941-0570)

## 2020-07-24 ENCOUNTER — OFFICE VISIT (OUTPATIENT)
Dept: URGENT CARE | Facility: PHYSICIAN GROUP | Age: 35
End: 2020-07-24
Payer: COMMERCIAL

## 2020-07-24 VITALS
DIASTOLIC BLOOD PRESSURE: 86 MMHG | SYSTOLIC BLOOD PRESSURE: 126 MMHG | WEIGHT: 175 LBS | RESPIRATION RATE: 18 BRPM | TEMPERATURE: 98.4 F | HEIGHT: 67 IN | HEART RATE: 80 BPM | BODY MASS INDEX: 27.47 KG/M2 | OXYGEN SATURATION: 96 %

## 2020-07-24 DIAGNOSIS — R00.2 HEART PALPITATIONS: ICD-10-CM

## 2020-07-24 PROCEDURE — 99214 OFFICE O/P EST MOD 30 MIN: CPT | Performed by: PHYSICIAN ASSISTANT

## 2020-07-24 PROCEDURE — 93000 ELECTROCARDIOGRAM COMPLETE: CPT | Performed by: PHYSICIAN ASSISTANT

## 2020-07-24 ASSESSMENT — ENCOUNTER SYMPTOMS
PALPITATIONS: 1
SPUTUM PRODUCTION: 0
FEVER: 0
COUGH: 0
DIARRHEA: 0
FOCAL WEAKNESS: 0
ABDOMINAL PAIN: 0
SENSORY CHANGE: 0
NAUSEA: 0
HEADACHES: 0
DIZZINESS: 0
VOMITING: 0
MUSCULOSKELETAL NEGATIVE: 1
IRREGULAR HEARTBEAT: 1
CHEST PRESSURE: 0
SPEECH CHANGE: 0
WEAKNESS: 0
NERVOUS/ANXIOUS: 0
TREMORS: 0
NUMBNESS: 0
WHEEZING: 0
EYES NEGATIVE: 1
SHORTNESS OF BREATH: 0

## 2020-07-24 NOTE — PATIENT INSTRUCTIONS
Palpitations  Palpitations are feelings that your heartbeat is irregular or is faster than normal. It may feel like your heart is fluttering or skipping a beat. Palpitations are usually not a serious problem. They may be caused by many things, including smoking, caffeine, alcohol, stress, and certain medicines or drugs. Most causes of palpitations are not serious. However, some palpitations can be a sign of a serious problem. You may need further tests to rule out serious medical problems.  Follow these instructions at home:         Pay attention to any changes in your condition. Take these actions to help manage your symptoms:  Eating and drinking  · Avoid foods and drinks that may cause palpitations. These may include:  ? Caffeinated coffee, tea, soft drinks, diet pills, and energy drinks.  ? Chocolate.  ? Alcohol.  Lifestyle  · Take steps to reduce your stress and anxiety. Things that can help you relax include:  ? Yoga.  ? Mind-body activities, such as deep breathing, meditation, or using words and images to create positive thoughts (guided imagery).  ? Physical activity, such as swimming, jogging, or walking. Tell your health care provider if your palpitations increase with activity. If you have chest pain or shortness of breath with activity, do not continue the activity until you are seen by your health care provider.  ? Biofeedback. This is a method that helps you learn to use your mind to control things in your body, such as your heartbeat.  · Do not use drugs, including cocaine or ecstasy. Do not use marijuana.  · Get plenty of rest and sleep. Keep a regular bed time.  General instructions  · Take over-the-counter and prescription medicines only as told by your health care provider.  · Do not use any products that contain nicotine or tobacco, such as cigarettes and e-cigarettes. If you need help quitting, ask your health care provider.  · Keep all follow-up visits as told by your health care provider. This  is important. These may include visits for further testing if palpitations do not go away or get worse.  Contact a health care provider if you:  · Continue to have a fast or irregular heartbeat after 24 hours.  · Notice that your palpitations occur more often.  Get help right away if you:  · Have chest pain or shortness of breath.  · Have a severe headache.  · Feel dizzy or you faint.  Summary  · Palpitations are feelings that your heartbeat is irregular or is faster than normal. It may feel like your heart is fluttering or skipping a beat.  · Palpitations may be caused by many things, including smoking, caffeine, alcohol, stress, certain medicines, and drugs.  · Although most causes of palpitations are not serious, some causes can be a sign of a serious medical problem.  · Get help right away if you faint or have chest pain, shortness of breath, a severe headache, or dizziness.  This information is not intended to replace advice given to you by your health care provider. Make sure you discuss any questions you have with your health care provider.  Document Released: 12/15/2001 Document Revised: 01/30/2019 Document Reviewed: 01/30/2019  Elsevier Patient Education © 2020 [employer] Inc.

## 2020-07-24 NOTE — PROGRESS NOTES
Subjective:      Jax Yang is a 34 y.o. male who presents with Palpitations (irregular heart beat, tired, x1 week )            Patient has been experiencing irregular heartbeat for the last week.  He denies palpitations per se but states every once while he feels a increased intensity or possible skipped beat.  He has no personal family history of cardiac etiology.  He has been feeling fatigued otherwise asymptomatic.  Patient has a few alcoholic beverages on his days off.  Denies drug use or nicotine use.  1 cup of coffee a day.  Denies any anxiety or increased stress.  He is a renal .    Palpitations    This is a new problem. The current episode started in the past 7 days. The problem occurs daily. The problem has been waxing and waning. The symptoms are aggravated by alcohol, caffeine and exercise. Associated symptoms include an irregular heartbeat and malaise/fatigue. Pertinent negatives include no anxiety, chest fullness, chest pain, coughing, dizziness, fever, nausea, numbness, shortness of breath, vomiting or weakness. He has tried nothing for the symptoms. The treatment provided no relief. Risk factors include being male. There is no history of anemia, anxiety, drug use, heart disease or hyperthyroidism.       PMH:  has a past medical history of Hypertension, Lyme disease (2012), and Tobacco abuse (11/18/2014).  MEDS:   Current Outpatient Medications:   •  lisinopril (PRINIVIL) 10 MG Tab, Take 10 mg by mouth every morning., Disp: , Rfl:   •  Multiple Vitamins-Minerals (MULTIVITAMIN ADULT PO), Take 1 Tab by mouth every morning., Disp: , Rfl:   •  FIBER PO, Take 4 Tabs by mouth ONE-HALF HOUR AFTER LUNCH., Disp: , Rfl:   ALLERGIES: No Known Allergies  SURGHX:   Past Surgical History:   Procedure Laterality Date   • TYMPANOPLASTY Left 1/7/2020    Procedure: TYMPANOPLASTY;  Surgeon: Therese Vann M.D.;  Location: SURGERY SAME DAY [facility];  Service: Ent   • CANALOPLASTY Left  "1/7/2020    Procedure: CANALOPLASTY, EAR;  Surgeon: Therese Vann M.D.;  Location: SURGERY SAME DAY [facility];  Service: Ent   • APPENDECTOMY       SOCHX:  reports that he quit smoking about 5 years ago. His smoking use included cigarettes. He has a 2.00 pack-year smoking history. He has never used smokeless tobacco. He reports previous alcohol use. He reports that he does not use drugs.  FH: family history includes Cancer in his father; Hypertension in his brother, father, and mother.      Review of Systems   Constitutional: Positive for malaise/fatigue. Negative for fever.   HENT: Negative.    Eyes: Negative.    Respiratory: Negative for cough, sputum production, shortness of breath and wheezing.    Cardiovascular: Positive for palpitations. Negative for chest pain and leg swelling.   Gastrointestinal: Negative for abdominal pain, diarrhea, nausea and vomiting.   Genitourinary: Negative.    Musculoskeletal: Negative.    Neurological: Negative for dizziness, tremors, sensory change, speech change, focal weakness, weakness, numbness and headaches.   Psychiatric/Behavioral: The patient is not nervous/anxious.        Medications, Allergies, and current problem list reviewed today in Epic     Objective:     /86 (BP Location: Right arm, Patient Position: Sitting, BP Cuff Size: Adult)   Pulse 80   Temp 36.9 °C (98.4 °F) (Temporal)   Resp 18   Ht 1.702 m (5' 7\")   Wt 79.4 kg (175 lb)   SpO2 96%   BMI 27.41 kg/m²      Physical Exam  Vitals signs and nursing note reviewed.   Constitutional:       General: He is not in acute distress.     Appearance: He is well-developed. He is not diaphoretic.   HENT:      Head: Normocephalic and atraumatic.      Right Ear: Tympanic membrane and external ear normal.      Left Ear: Tympanic membrane and external ear normal.      Nose: Nose normal. No congestion or rhinorrhea.      Mouth/Throat:      Pharynx: No oropharyngeal exudate or posterior oropharyngeal erythema. "   Eyes:      General:         Right eye: No discharge.         Left eye: No discharge.      Conjunctiva/sclera: Conjunctivae normal.      Pupils: Pupils are equal, round, and reactive to light.   Neck:      Musculoskeletal: Normal range of motion and neck supple.   Cardiovascular:      Rate and Rhythm: Normal rate and regular rhythm.      Pulses: Normal pulses.      Heart sounds: Normal heart sounds. No murmur.   Pulmonary:      Effort: Pulmonary effort is normal. No respiratory distress.      Breath sounds: Normal breath sounds. No wheezing, rhonchi or rales.   Chest:      Chest wall: No tenderness.   Abdominal:      General: Abdomen is flat. There is no distension.      Tenderness: There is no abdominal tenderness. There is no right CVA tenderness, left CVA tenderness, guarding or rebound.   Lymphadenopathy:      Cervical: No cervical adenopathy.   Skin:     General: Skin is warm and dry.   Neurological:      Mental Status: He is alert and oriented to person, place, and time.   Psychiatric:         Behavior: Behavior normal.         Thought Content: Thought content normal.         Judgment: Judgment normal.                 Assessment/Plan:     1. Heart palpitations  REFERRAL TO CARDIOLOGY General Cardiology MD    EKG - Clinic Performed     1 week history of irregular heartbeat.  Possible palpitations or skipping a beat per patient.  He has had some increased fatigue.  Otherwise completely asymptomatic.  He denies any past personal history of cardiac etiology.  Father had MI in his 50s.  Patient denies any drug or nicotine use.  He does drink alcohol and caffeine.  Eyes any increased stress or anxiety.  Exam completely unremarkable.  Several EKGs completed.  Early EKG showed sinus arrhythmia rate 78.  Later EKG showed sinus rhythm rate 84 with APCs.  Patient is young healthy without risk factors or past medical history.  Patient will be referred to cardiology for further work-up.  He has follow-up with PCP next  week for lab work.  OTC meds and conservative measures as discussed    Return to clinic or go to ED if symptoms worsen or persist. Indications for ED discussed at length. Patient voices understanding. Follow-up with your primary care provider in 3-5 days. Red flags discussed. All side effects of medication discussed including allergic response, GI upset, tendon injury, etc.    Please note that this dictation was created using voice recognition software. I have made every reasonable attempt to correct obvious errors, but I expect that there are errors of grammar and possibly content that I did not discover before finalizing the note.

## 2020-07-30 ENCOUNTER — OFFICE VISIT (OUTPATIENT)
Dept: MEDICAL GROUP | Facility: MEDICAL CENTER | Age: 35
End: 2020-07-30
Payer: COMMERCIAL

## 2020-07-30 VITALS
HEART RATE: 75 BPM | OXYGEN SATURATION: 96 % | BODY MASS INDEX: 28.22 KG/M2 | WEIGHT: 179.8 LBS | SYSTOLIC BLOOD PRESSURE: 108 MMHG | HEIGHT: 67 IN | TEMPERATURE: 97.9 F | DIASTOLIC BLOOD PRESSURE: 82 MMHG

## 2020-07-30 DIAGNOSIS — Z00.00 WELLNESS EXAMINATION: ICD-10-CM

## 2020-07-30 DIAGNOSIS — R53.82 CHRONIC FATIGUE: ICD-10-CM

## 2020-07-30 DIAGNOSIS — Z20.822 EXPOSURE TO COVID-19 VIRUS: ICD-10-CM

## 2020-07-30 DIAGNOSIS — Z13.6 SCREENING FOR CARDIOVASCULAR CONDITION: ICD-10-CM

## 2020-07-30 DIAGNOSIS — G44.52 NEW PERSISTENT DAILY HEADACHE: ICD-10-CM

## 2020-07-30 DIAGNOSIS — R00.2 PALPITATIONS: ICD-10-CM

## 2020-07-30 PROBLEM — S62.316D: Status: ACTIVE | Noted: 2020-07-01

## 2020-07-30 PROBLEM — R68.83 CHILLS: Status: RESOLVED | Noted: 2019-09-11 | Resolved: 2020-07-30

## 2020-07-30 PROBLEM — R61 EXCESSIVE SWEATING: Status: RESOLVED | Noted: 2018-12-14 | Resolved: 2020-07-30

## 2020-07-30 PROCEDURE — 99214 OFFICE O/P EST MOD 30 MIN: CPT | Mod: CS | Performed by: PHYSICIAN ASSISTANT

## 2020-07-30 ASSESSMENT — PATIENT HEALTH QUESTIONNAIRE - PHQ9: CLINICAL INTERPRETATION OF PHQ2 SCORE: 0

## 2020-07-31 ENCOUNTER — HOSPITAL ENCOUNTER (OUTPATIENT)
Dept: LAB | Facility: MEDICAL CENTER | Age: 35
End: 2020-07-31
Attending: PHYSICIAN ASSISTANT
Payer: COMMERCIAL

## 2020-07-31 DIAGNOSIS — Z00.00 WELLNESS EXAMINATION: ICD-10-CM

## 2020-07-31 DIAGNOSIS — R53.82 CHRONIC FATIGUE: ICD-10-CM

## 2020-07-31 DIAGNOSIS — Z20.822 EXPOSURE TO COVID-19 VIRUS: ICD-10-CM

## 2020-07-31 DIAGNOSIS — Z13.6 SCREENING FOR CARDIOVASCULAR CONDITION: ICD-10-CM

## 2020-07-31 LAB
ALBUMIN SERPL BCP-MCNC: 4.7 G/DL (ref 3.2–4.9)
ALBUMIN/GLOB SERPL: 2.1 G/DL
ALP SERPL-CCNC: 74 U/L (ref 30–99)
ALT SERPL-CCNC: 21 U/L (ref 2–50)
ANION GAP SERPL CALC-SCNC: 13 MMOL/L (ref 7–16)
AST SERPL-CCNC: 12 U/L (ref 12–45)
BASOPHILS # BLD AUTO: 1 % (ref 0–1.8)
BASOPHILS # BLD: 0.04 K/UL (ref 0–0.12)
BILIRUB SERPL-MCNC: 0.4 MG/DL (ref 0.1–1.5)
BUN SERPL-MCNC: 14 MG/DL (ref 8–22)
CALCIUM SERPL-MCNC: 9.6 MG/DL (ref 8.5–10.5)
CHLORIDE SERPL-SCNC: 99 MMOL/L (ref 96–112)
CHOLEST SERPL-MCNC: 185 MG/DL (ref 100–199)
CO2 SERPL-SCNC: 24 MMOL/L (ref 20–33)
CREAT SERPL-MCNC: 0.79 MG/DL (ref 0.5–1.4)
EOSINOPHIL # BLD AUTO: 0.06 K/UL (ref 0–0.51)
EOSINOPHIL NFR BLD: 1.5 % (ref 0–6.9)
ERYTHROCYTE [DISTWIDTH] IN BLOOD BY AUTOMATED COUNT: 39.1 FL (ref 35.9–50)
FASTING STATUS PATIENT QL REPORTED: NORMAL
FERRITIN SERPL-MCNC: 181 NG/ML (ref 22–322)
GLOBULIN SER CALC-MCNC: 2.2 G/DL (ref 1.9–3.5)
GLUCOSE SERPL-MCNC: 95 MG/DL (ref 65–99)
HCT VFR BLD AUTO: 49.6 % (ref 42–52)
HDLC SERPL-MCNC: 38 MG/DL
HGB BLD-MCNC: 17 G/DL (ref 14–18)
IMM GRANULOCYTES # BLD AUTO: 0 K/UL (ref 0–0.11)
IMM GRANULOCYTES NFR BLD AUTO: 0 % (ref 0–0.9)
IRON SATN MFR SERPL: 38 % (ref 15–55)
IRON SERPL-MCNC: 106 UG/DL (ref 50–180)
LDLC SERPL CALC-MCNC: 125 MG/DL
LYMPHOCYTES # BLD AUTO: 2.01 K/UL (ref 1–4.8)
LYMPHOCYTES NFR BLD: 49.5 % (ref 22–41)
MCH RBC QN AUTO: 29.5 PG (ref 27–33)
MCHC RBC AUTO-ENTMCNC: 34.3 G/DL (ref 33.7–35.3)
MCV RBC AUTO: 86.1 FL (ref 81.4–97.8)
MONOCYTES # BLD AUTO: 0.3 K/UL (ref 0–0.85)
MONOCYTES NFR BLD AUTO: 7.4 % (ref 0–13.4)
NEUTROPHILS # BLD AUTO: 1.65 K/UL (ref 1.82–7.42)
NEUTROPHILS NFR BLD: 40.6 % (ref 44–72)
NRBC # BLD AUTO: 0 K/UL
NRBC BLD-RTO: 0 /100 WBC
PLATELET # BLD AUTO: 225 K/UL (ref 164–446)
PMV BLD AUTO: 10.3 FL (ref 9–12.9)
POTASSIUM SERPL-SCNC: 4.2 MMOL/L (ref 3.6–5.5)
PROT SERPL-MCNC: 6.9 G/DL (ref 6–8.2)
RBC # BLD AUTO: 5.76 M/UL (ref 4.7–6.1)
SODIUM SERPL-SCNC: 136 MMOL/L (ref 135–145)
TIBC SERPL-MCNC: 278 UG/DL (ref 250–450)
TRIGL SERPL-MCNC: 110 MG/DL (ref 0–149)
TSH SERPL DL<=0.005 MIU/L-ACNC: 4.61 UIU/ML (ref 0.38–5.33)
UIBC SERPL-MCNC: 172 UG/DL (ref 110–370)
WBC # BLD AUTO: 4.1 K/UL (ref 4.8–10.8)

## 2020-07-31 PROCEDURE — 36415 COLL VENOUS BLD VENIPUNCTURE: CPT

## 2020-07-31 PROCEDURE — 84402 ASSAY OF FREE TESTOSTERONE: CPT

## 2020-07-31 PROCEDURE — 85025 COMPLETE CBC W/AUTO DIFF WBC: CPT

## 2020-07-31 PROCEDURE — 80053 COMPREHEN METABOLIC PANEL: CPT

## 2020-07-31 PROCEDURE — 84270 ASSAY OF SEX HORMONE GLOBUL: CPT

## 2020-07-31 PROCEDURE — 84443 ASSAY THYROID STIM HORMONE: CPT

## 2020-07-31 PROCEDURE — 84403 ASSAY OF TOTAL TESTOSTERONE: CPT

## 2020-07-31 PROCEDURE — 83540 ASSAY OF IRON: CPT

## 2020-07-31 PROCEDURE — 86769 SARS-COV-2 COVID-19 ANTIBODY: CPT

## 2020-07-31 PROCEDURE — 80061 LIPID PANEL: CPT

## 2020-07-31 PROCEDURE — 82728 ASSAY OF FERRITIN: CPT

## 2020-07-31 PROCEDURE — 83550 IRON BINDING TEST: CPT

## 2020-07-31 NOTE — ASSESSMENT & PLAN NOTE
History of. Seen in urgent care. One EKG normal sinus rhythm. One EKG with sinus tachycardia. Feeling symptoms for a few weeks. No known cause. Eating really healthy. Not under more stress. Less caffeine. No SOB, chest pain, difficulty breathing. Referral placed to cardiology. 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 Hasn't had this problem previously. No h/o anemia, thyroid disorder. No illegal substance use.

## 2020-07-31 NOTE — PROGRESS NOTES
"Subjective:   Jax Yang is a 34 y.o. male here today for f/u palpitations and discussion of chronic fatigue. . 2 y/o son. Non-smoker. . Recent right 5th metacarpal bone fracture, managed with ortho.    Palpitations  History of. Seen in urgent care. One EKG normal sinus rhythm. One EKG with sinus tachycardia. Feeling symptoms for a few weeks. No known cause. Eating really healthy. Not under more stress. Less caffeine. No SOB, chest pain, difficulty breathing. Referral placed to cardiology. Gets yearly EKGs as part of yearly physical with the [employer] department. Hasn't had this problem previously. No h/o anemia, thyroid disorder. No illegal substance use.    Chronic fatigue  Chronic but worse over the past year. Snores. Very tired during the day no matter how healthy he eats. Associated with headache each morning.       Current medicines (including changes today)  Current Outpatient Medications   Medication Sig Dispense Refill   • lisinopril (PRINIVIL) 10 MG Tab Take 10 mg by mouth every morning.     • Multiple Vitamins-Minerals (MULTIVITAMIN ADULT PO) Take 1 Tab by mouth every morning.     • FIBER PO Take 4 Tabs by mouth ONE-HALF HOUR AFTER LUNCH.       No current facility-administered medications for this visit.      He  has a past medical history of Hypertension, Lyme disease (2012), and Tobacco abuse (11/18/2014).    ROS   No fever/chills. No weight change. No dizziness. No focal weakness. No sore throat, nasal congestion, ear pain. No chest pain, no shortness of breath, difficulty breathing. No n/v/d/c or abdominal pain. No urinary complaint. No rash or skin lesion.        Objective:     /82 (BP Location: Left arm, Patient Position: Sitting, BP Cuff Size: Adult long)   Pulse 75   Temp 36.6 °C (97.9 °F) (Temporal)   Ht 1.702 m (5' 7\")   Wt 81.6 kg (179 lb 12.8 oz)   SpO2 96%  Body mass index is 28.16 kg/m².   Physical Exam:  Constitutional: WDWN, NAD  Skin: Warm, " dry, good turgor, no rashes in visible areas.  Respiratory: Unlabored respiratory effort, lungs clear to auscultation, no wheezes, no ronchi.  Cardiovascular: Normal S1, S2, no murmur, no leg edema.  Psych: Alert and oriented x3, normal affect and mood.    Assessment and Plan:   The following treatment plan was discussed    1. Palpitations  Will f/u with cardiology as scheduled    2. Chronic fatigue    - CBC WITH DIFFERENTIAL; Future  - IRON/TOTAL IRON BIND; Future  - TSH WITH REFLEX TO FT4; Future  - FERRITIN; Future  - REFERRAL TO SLEEP STUDIES  - TESTOSTERONE, FREE AND TOTAL; Future    3. New persistent daily headache    - REFERRAL TO SLEEP STUDIES    4. Wellness examination    - Comp Metabolic Panel; Future    5. Screening for cardiovascular condition    - Lipid Profile; Future    6. Exposure to COVID-19 virus    - COVID-19 IgG, Qual; Future      Followup: as needed

## 2020-07-31 NOTE — ASSESSMENT & PLAN NOTE
Chronic but worse over the past year. Snores. Very tired during the day no matter how healthy he eats. Associated with headache each morning.

## 2020-08-03 LAB
SARS-COV-2 IGG SERPLBLD QL IA.RAPID: NEGATIVE
SHBG SERPL-SCNC: 25 NMOL/L (ref 11–80)
TESTOST FREE MFR SERPL: 2.2 % (ref 1.6–2.9)
TESTOST FREE SERPL-MCNC: 107 PG/ML (ref 47–244)
TESTOST SERPL-MCNC: 493 NG/DL (ref 300–1080)

## 2020-08-21 RX ORDER — LISINOPRIL 10 MG/1
TABLET ORAL
Qty: 90 TAB | Refills: 3 | Status: SHIPPED | OUTPATIENT
Start: 2020-08-21 | End: 2021-05-11 | Stop reason: SDUPTHER

## 2020-08-24 ENCOUNTER — TELEPHONE (OUTPATIENT)
Dept: CARDIOLOGY | Facility: MEDICAL CENTER | Age: 35
End: 2020-08-24

## 2020-08-24 NOTE — TELEPHONE ENCOUNTER
Called patient to see if he has followed with a cardiologist in the past to get records prior to new patient appt with [facility]. Denies any cardiac testing done outside of [facility].

## 2020-08-27 ENCOUNTER — OFFICE VISIT (OUTPATIENT)
Dept: CARDIOLOGY | Facility: MEDICAL CENTER | Age: 35
End: 2020-08-27
Payer: COMMERCIAL

## 2020-08-27 VITALS
HEART RATE: 78 BPM | OXYGEN SATURATION: 96 % | SYSTOLIC BLOOD PRESSURE: 110 MMHG | DIASTOLIC BLOOD PRESSURE: 74 MMHG | WEIGHT: 178.8 LBS | BODY MASS INDEX: 28.06 KG/M2 | HEIGHT: 67 IN

## 2020-08-27 DIAGNOSIS — I10 ESSENTIAL HYPERTENSION: ICD-10-CM

## 2020-08-27 DIAGNOSIS — R00.2 PALPITATIONS: ICD-10-CM

## 2020-08-27 DIAGNOSIS — Z91.89 10 YEAR RISK OF MI OR STROKE < 7.5%: ICD-10-CM

## 2020-08-27 LAB — EKG IMPRESSION: NORMAL

## 2020-08-27 PROCEDURE — 99204 OFFICE O/P NEW MOD 45 MIN: CPT | Performed by: INTERNAL MEDICINE

## 2020-08-27 PROCEDURE — 93000 ELECTROCARDIOGRAM COMPLETE: CPT | Performed by: INTERNAL MEDICINE

## 2020-08-27 ASSESSMENT — ENCOUNTER SYMPTOMS
NAUSEA: 0
FEVER: 0
BLURRED VISION: 0
PALPITATIONS: 1
ALTERED MENTAL STATUS: 0
DEPRESSION: 0
HEARTBURN: 0
DIZZINESS: 0
DIARRHEA: 0
COUGH: 0
SYNCOPE: 0
SHORTNESS OF BREATH: 0
NEAR-SYNCOPE: 0
BACK PAIN: 0
PND: 0
HEADACHES: 1
IRREGULAR HEARTBEAT: 0
SNORING: 1
ABDOMINAL PAIN: 0
DYSPNEA ON EXERTION: 0
WEIGHT GAIN: 0
ORTHOPNEA: 0
WEIGHT LOSS: 0
VOMITING: 0
CLAUDICATION: 0
FLANK PAIN: 0
DECREASED APPETITE: 0
CONSTIPATION: 0

## 2020-08-27 ASSESSMENT — FIBROSIS 4 INDEX: FIB4 SCORE: 0.38

## 2020-08-27 NOTE — PROGRESS NOTES
Cardiology Note    Chief Complaint   Patient presents with   • New Patient     Palpitations       History of Present Illness: Jax Yang is a 34 y.o. male PMH HTN who presents for palpitations.    Describes fatigue and poor sleep. Works nights as police office. Has been on lisinopril since 2015. Was started due to hypertension. He does not remember context for starting, but doesn't think had very high pressures. Home blood pressure typically 110-120/70-80 despite taking with varied frequency because of irregular schedule. With respect to palpitations, initially started a few years ago. Believes associated with lisinopril. Can last for days and is intermittent. Went to urgent care and found PACs. Describes daily average two cups of coffee and often an energy drink (monster zero). Alcohol weekends only about two drinks. Denies tobacco or other toxic social habits. Palpitations now too infrequent to predict. Believes sleep apnea likely and pending testing.    Review of Systems   Constitution: Positive for malaise/fatigue. Negative for decreased appetite, fever, weight gain and weight loss.   HENT: Negative for congestion and nosebleeds.    Eyes: Negative for blurred vision.   Cardiovascular: Positive for palpitations. Negative for chest pain, claudication, dyspnea on exertion, irregular heartbeat, leg swelling, near-syncope, orthopnea, paroxysmal nocturnal dyspnea and syncope.   Respiratory: Positive for snoring. Negative for cough and shortness of breath.    Endocrine: Negative for cold intolerance and heat intolerance.   Skin: Negative for rash.   Musculoskeletal: Negative for back pain.   Gastrointestinal: Negative for abdominal pain, constipation, diarrhea, heartburn, melena, nausea and vomiting.   Genitourinary: Negative for dysuria, flank pain and hematuria.   Neurological: Positive for headaches. Negative for dizziness.   Psychiatric/Behavioral: Negative for altered mental status and depression.          Past Medical History:   Diagnosis Date   • Hypertension    • Lyme disease    • Tobacco abuse 2014         Past Surgical History:   Procedure Laterality Date   • TYMPANOPLASTY Left 2020    Procedure: TYMPANOPLASTY;  Surgeon: Therese Vann M.D.;  Location: SURGERY SAME DAY [facility];  Service: Ent   • CANALOPLASTY Left 2020    Procedure: CANALOPLASTY, EAR;  Surgeon: Therese Vann M.D.;  Location: SURGERY SAME DAY [facility];  Service: Ent   • APPENDECTOMY           Current Outpatient Medications   Medication Sig Dispense Refill   • lisinopril (PRINIVIL) 10 MG Tab TAKE 1 TABLET BY MOUTH EVERY DAY 90 Tab 3   • Multiple Vitamins-Minerals (MULTIVITAMIN ADULT PO) Take 1 Tab by mouth every morning.     • FIBER PO Take 4 Tabs by mouth ONE-HALF HOUR AFTER LUNCH.       No current facility-administered medications for this visit.          No Known Allergies      Family History   Problem Relation Age of Onset   • Hypertension Mother    • Cancer Father         lung, mets age 56   • Hypertension Father    • Hypertension Brother          Social History     Socioeconomic History   • Marital status:      Spouse name: Not on file   • Number of children: Not on file   • Years of education: Not on file   • Highest education level: Not on file   Occupational History   • Not on file   Social Needs   • Financial resource strain: Not on file   • Food insecurity     Worry: Not on file     Inability: Not on file   • Transportation needs     Medical: Not on file     Non-medical: Not on file   Tobacco Use   • Smoking status: Former Smoker     Packs/day: 0.25     Years: 8.00     Pack years: 2.00     Types: Cigarettes     Quit date: 2015     Years since quittin.3   • Smokeless tobacco: Never Used   • Tobacco comment: quit Dec 2015   Substance and Sexual Activity   • Alcohol use: Not Currently     Alcohol/week: 0.0 oz   • Drug use: No   • Sexual activity: Yes     Partners: Female   Lifestyle   •  "Physical activity     Days per week: Not on file     Minutes per session: Not on file   • Stress: Not on file   Relationships   • Social connections     Talks on phone: Not on file     Gets together: Not on file     Attends [employer]alian service: Not on file     Active member of club or organization: Not on file     Attends meetings of clubs or organizations: Not on file     Relationship status: Not on file   • Intimate partner violence     Fear of current or ex partner: Not on file     Emotionally abused: Not on file     Physically abused: Not on file     Forced sexual activity: Not on file   Other Topics Concern   • Not on file   Social History Narrative   • Not on file         Physical Exam:  Ambulatory Vitals  /74 (BP Location: Left arm, Patient Position: Sitting, BP Cuff Size: Adult)   Pulse 78   Ht 1.702 m (5' 7\")   Wt 81.1 kg (178 lb 12.8 oz)   SpO2 96%    BP Readings from Last 4 Encounters:   08/27/20 110/74   07/30/20 108/82   07/24/20 126/86   01/07/20 139/86     Weight/BMI:   Vitals:    08/27/20 0802   BP: 110/74   Weight: 81.1 kg (178 lb 12.8 oz)   Height: 1.702 m (5' 7\")    Body mass index is 28 kg/m².  Wt Readings from Last 4 Encounters:   08/27/20 81.1 kg (178 lb 12.8 oz)   07/30/20 81.6 kg (179 lb 12.8 oz)   07/24/20 79.4 kg (175 lb)   01/07/20 81.7 kg (180 lb 1.9 oz)       Physical Exam   Constitutional: He is oriented to person, place, and time and well-developed, well-nourished, and in no distress. No distress.   HENT:   Head: Normocephalic and atraumatic.   Eyes: Pupils are equal, round, and reactive to light. Conjunctivae are normal.   Neck: Normal range of motion. Neck supple. No JVD present.   Cardiovascular: Normal rate, regular rhythm, normal heart sounds and intact distal pulses. Exam reveals no gallop and no friction rub.   No murmur heard.  Pulmonary/Chest: Effort normal and breath sounds normal. No respiratory distress. He has no wheezes. He has no rales. He exhibits no tenderness. "   Abdominal: Soft. Bowel sounds are normal. He exhibits no distension.   Musculoskeletal:         General: No edema.   Neurological: He is alert and oriented to person, place, and time.   Skin: Skin is warm and dry.   Psychiatric: Affect and judgment normal.       Lab Data Review:  Lab Results   Component Value Date/Time    CHOLSTRLTOT 185 07/31/2020 10:05 AM     (H) 07/31/2020 10:05 AM    HDL 38 (A) 07/31/2020 10:05 AM    TRIGLYCERIDE 110 07/31/2020 10:05 AM       Lab Results   Component Value Date/Time    SODIUM 136 07/31/2020 10:05 AM    POTASSIUM 4.2 07/31/2020 10:05 AM    CHLORIDE 99 07/31/2020 10:05 AM    CO2 24 07/31/2020 10:05 AM    GLUCOSE 95 07/31/2020 10:05 AM    BUN 14 07/31/2020 10:05 AM    CREATININE 0.79 07/31/2020 10:05 AM     CrCl cannot be calculated (Patient's most recent lab result is older than the maximum 7 days allowed.).  Lab Results   Component Value Date/Time    ALKPHOSPHAT 74 07/31/2020 10:05 AM    ASTSGOT 12 07/31/2020 10:05 AM    ALTSGPT 21 07/31/2020 10:05 AM    TBILIRUBIN 0.4 07/31/2020 10:05 AM      Lab Results   Component Value Date/Time    WBC 4.1 (L) 07/31/2020 10:05 AM     No results found for: HBA1C  No components found for: TROP      Cardiac Imaging and Procedures Review:      EKG 7/24/20 reviewed by me sinus with PAC    Medical Decision Making:  Problem List Items Addressed This Visit     Essential hypertension    Palpitations    Relevant Orders    EKG (Completed)    10 year risk of MI or stroke low; light time risk ascvd >40%        HTN - goal 140/90. Attempt holding lisinopril. If sustains elevated can reintroduce. Although, very likely has sleep apnea for which pending testing which is likely to improve blood pressure. Would aim for no pharmacotherapy as much as possible.    Palpitations appears captured PACs both at urgent care and prior office visits as well as EKG today. He has kardia device at home and will record if feels further palpitations.    Elevated  lifetime risk. Recommend regular exercise 150 min weekly moderate intensity and diet low in salt and cholesterol. In his case, elegant/simple solution would be vegetarian or plant based diet but he does not seem excited about this.     It was my pleasure to meet with Mr. Yang.

## 2020-08-27 NOTE — PATIENT INSTRUCTIONS
Goal 140/90. Attempt stopping lisinopril. Call if blood pressure elevated.    [employer] device.

## 2020-09-25 ENCOUNTER — SLEEP CENTER VISIT (OUTPATIENT)
Dept: SLEEP MEDICINE | Facility: MEDICAL CENTER | Age: 35
End: 2020-09-25
Payer: COMMERCIAL

## 2020-09-25 VITALS
OXYGEN SATURATION: 96 % | DIASTOLIC BLOOD PRESSURE: 70 MMHG | HEART RATE: 80 BPM | WEIGHT: 181 LBS | RESPIRATION RATE: 16 BRPM | BODY MASS INDEX: 28.41 KG/M2 | HEIGHT: 67 IN | SYSTOLIC BLOOD PRESSURE: 122 MMHG

## 2020-09-25 DIAGNOSIS — R00.2 PALPITATIONS: ICD-10-CM

## 2020-09-25 DIAGNOSIS — I10 ESSENTIAL HYPERTENSION: ICD-10-CM

## 2020-09-25 DIAGNOSIS — G47.33 OSA (OBSTRUCTIVE SLEEP APNEA): ICD-10-CM

## 2020-09-25 PROCEDURE — 99243 OFF/OP CNSLTJ NEW/EST LOW 30: CPT | Performed by: INTERNAL MEDICINE

## 2020-09-25 RX ORDER — ZOLPIDEM TARTRATE 5 MG/1
5 TABLET ORAL NIGHTLY PRN
Qty: 2 TAB | Refills: 0 | Status: SHIPPED | OUTPATIENT
Start: 2020-09-25 | End: 2020-09-27

## 2020-09-25 RX ORDER — ZOLPIDEM TARTRATE 5 MG/1
5 TABLET ORAL NIGHTLY PRN
Qty: 2 TAB | Refills: 0 | Status: SHIPPED | OUTPATIENT
Start: 2020-09-25 | End: 2020-09-25 | Stop reason: CLARIF

## 2020-09-25 ASSESSMENT — FIBROSIS 4 INDEX: FIB4 SCORE: 0.38

## 2020-09-25 NOTE — PROGRESS NOTES
"Chief Complaint   Patient presents with   • New Patient     sleep consultation       HPI: This patient is a 34 y.o. male who is referred for sleep apnea evaluation.  He works as a , on graveyard shift.  He describes a longtime history of feeling fatigued and \"in a fog\", with exacerbation over the past 6 months.  [place] which was allegedly negative.  He feels his sleep problems have progressively worsened.  He snores loudly and his wife is disturbed by his snoring.  He is unaware of apneas.  He describes constant awakenings from sleep for no specific reason.  He goes to bed at 8 AM and gets up by 3 PM.  On weekends he flips sleep schedule so he can spend time with his family.  His sleep hygiene is otherwise good.  He consistently wakes up with a headache.  Gilford sleepiness score is 5.  Weight has been stable.      Past Medical History:   Diagnosis Date   • Hypertension    • Lyme disease    • Tobacco abuse 2014       Social History     Socioeconomic History   • Marital status:      Spouse name: Not on file   • Number of children: Not on file   • Years of education: Not on file   • Highest education level: Not on file   Occupational History   • Not on file   Social Needs   • Financial resource strain: Not on file   • Food insecurity     Worry: Not on file     Inability: Not on file   • Transportation needs     Medical: Not on file     Non-medical: Not on file   Tobacco Use   • Smoking status: Former Smoker     Packs/day: 0.25     Years: 8.00     Pack years: 2.00     Types: Cigarettes     Quit date: 2015     Years since quittin.4   • Smokeless tobacco: Never Used   • Tobacco comment: quit Dec 2015   Substance and Sexual Activity   • Alcohol use: Not Currently     Alcohol/week: 0.0 oz   • Drug use: No   • Sexual activity: Yes     Partners: Female   Lifestyle   • Physical activity     Days per week: Not on file     Minutes per session: " Not on file   • Stress: Not on file   Relationships   • Social connections     Talks on phone: Not on file     Gets together: Not on file     Attends Voodoo service: Not on file     Active member of club or organization: Not on file     Attends meetings of clubs or organizations: Not on file     Relationship status: Not on file   • Intimate partner violence     Fear of current or ex partner: Not on file     Emotionally abused: Not on file     Physically abused: Not on file     Forced sexual activity: Not on file   Other Topics Concern   • Not on file   Social History Narrative   • Not on file       Family History   Problem Relation Age of Onset   • Hypertension Mother    • Cancer Father         lung, mets age 56   • Hypertension Father    • Sleep Apnea Father    • Hypertension Brother        Current Outpatient Medications on File Prior to Visit   Medication Sig Dispense Refill   • lisinopril (PRINIVIL) 10 MG Tab TAKE 1 TABLET BY MOUTH EVERY DAY 90 Tab 3   • Multiple Vitamins-Minerals (MULTIVITAMIN ADULT PO) Take 1 Tab by mouth every morning.     • FIBER PO Take 4 Tabs by mouth ONE-HALF HOUR AFTER LUNCH.       No current facility-administered medications on file prior to visit.        Allergies: Patient has no known allergies.    ROS:   Constitutional: Denies fevers, chills, night sweats, fatigue or weight loss  Eyes: Denies vision loss, pain, drainage, double vision  Ears, Nose, Throat: Denies earache, difficulty hearing, tinnitus, nasal congestion, hoarseness  Cardiovascular: Denies chest pain, tightness, palpitations, orthopnea or edema  Respiratory: Denies shortness of breath, cough, wheezing, hemoptysis  Sleep: As in HPI  GI: Denies heartburn, dysphagia, nausea, abdominal pain, diarrhea or constipation  : Denies frequent urination, hematuria, discharge or painful urination  Musculoskeletal: Denies back pain, +painful joints, sore muscles  Neurological: Denies weakness or headaches  Skin: No rashes    BP  "122/70 (BP Location: Left arm, Patient Position: Sitting, BP Cuff Size: Adult)   Pulse 80   Resp 16   Ht 1.702 m (5' 7\")   Wt 82.1 kg (181 lb)   SpO2 96%     Physical Exam:  Appearance: Well-nourished, well-developed, in no acute distress  HEENT: Normocephalic, atraumatic, white sclera, PERRLA, oropharynx clear, Mallampati 3  Neck: No adenopathy or masses  Respiratory: no intercostal retractions or accessory muscle use  Lungs auscultation: Clear to auscultation bilaterally  Cardiovascular: Regular rate rhythm. No murmurs, rubs or gallops.  No LE edema  Abdomen: soft, nondistended  Gait: Normal  Digits: No clubbing, cyanosis  Motor: No focal deficits  Orientation: Oriented to time, person and place    Diagnosis:  1. KEON (obstructive sleep apnea)  Polysomnography, 4 or More    zolpidem (AMBIEN) 5 MG Tab    DISCONTINUED: zolpidem (AMBIEN) 5 MG Tab   2. Essential hypertension     3. Palpitations     4.      Graveyard shift worker      Plan:  The patient has loud snoring, interrupted and nonrestorative sleep, in the setting of a crowded airway, with high clinical suspicion for obstructive sleep apnea.  He describes significant associated fatigue and somnolence.  He is a graveyard shift worker; his sleep hygiene appears good overall.  We discussed the pathophysiology of sleep apnea and benefits of treatment.  We will proceed with polysomnography for assessment.  Return for after sleep study.      "

## 2020-10-05 ENCOUNTER — APPOINTMENT (OUTPATIENT)
Dept: SLEEP MEDICINE | Facility: MEDICAL CENTER | Age: 35
End: 2020-10-05
Payer: COMMERCIAL

## 2020-10-29 ENCOUNTER — SLEEP STUDY (OUTPATIENT)
Dept: SLEEP MEDICINE | Facility: MEDICAL CENTER | Age: 35
End: 2020-10-29
Attending: INTERNAL MEDICINE
Payer: COMMERCIAL

## 2020-10-29 DIAGNOSIS — G47.33 OSA (OBSTRUCTIVE SLEEP APNEA): ICD-10-CM

## 2020-10-30 NOTE — PROCEDURES
Technical summary:The patient underwent a diagnostic polysomnogram. This was a 16 channel montage study to include a 6 channel EEG, a 2 channel EOG, and chin EMG, left and right leg EMG, a snore channel, a nasal pressure transducer, and a nasal oral airflow thermistor.   Respiratory effort was assessed with the use of a thoracic and abdominal monitor and overnight oximetry was obtained. Audio and video recordings were reviewed. This was a fully attended study and sleep stage scoring was performed. The test was technically adequate.       Scoring Criteria: A modification of the the AASM Manual for the Scoring of Sleep and Associated Events. Obstructive apnea was scored by cessation of airflow for at least 10 seconds with continuing respiratory effort.  Central apnea was scored by cessation of airflow for at least 10 seconds with no effort.  Hypopnea was scored by a 30% or more reduction in airflow for at least 10 seconds accompanied by an arterial oxygen desaturation of 3% or more.  (For Medicare patients, hypopneas were scored by a 30% or more reduction in airflow for at least 10 seconds accompanied by an arterial oxygen saturation of 4% or more, as required by their insurance, CMS.    Interpretation:  Study start time was 10:14:30 PM. Diagnostic recording time was 6h 53.0m with a total sleep time of 5h 57.0m resulting in a sleep efficiency of 86.44%%.   Sleep latency from the start of the study was 04 minutes and the latency from sleep to REM was 128 minutes.  In total,86 arousals were scored for an arousal index of 14.5.    Respiratory:  There were a total of 0 apneas consisting of 0 obstructive apneas, 0 mixed apneas, and 0 central apneas. A total of 13 hypopneas were scored.  The apnea index was 0.00 per hour and the hypopnea index was 2.18 per hour resulting in an overall AHI of 2.18.  AHI during rem was 1.3 and AHI while supine was 3.05.    Oximetry:  There was a mean oxygen saturation of 94.0% with a minimum  oxygen saturation of 89.0%. Time spent with oxygen saturations below 89% was 3.6 minutes.    Cardiac:  The highest heart rate seen while awake was 120 BPM while the highest heart rate during sleep was 119 BPM with an average sleeping heart rate of 85 BPM.    Limb Movements:  There were a total of 18 PLMs during sleep, of which 1 were PLMS arousals. This resulted in a PLMS index of 3.0 and a PLMS arousal index of 0.2.    Impression:  1.  Normal of AHI 2.2/hr and O2 andi 90 %  2.  Normal oximetry      Recommendations:  Clinical correlation is required. This study does not clinically suggest obstructive sleep apnea with a normal AHI of 2.2/hr and normal oximetry.  In general patients with daytime sleepiness are advised to avoid sedatives and do not operate a motor vehicle while drowsy.

## 2020-10-31 ENCOUNTER — TELEPHONE (OUTPATIENT)
Dept: SLEEP MEDICINE | Facility: MEDICAL CENTER | Age: 35
End: 2020-10-31

## 2020-11-01 NOTE — TELEPHONE ENCOUNTER
Jax was here for an overnight PSG on 10/29/2020 in exam room #2. His prescription for zolpidem 5mg was found on 10/31/2020.     The prescription is in the manager's desk in the second drawer.

## 2020-11-02 PROCEDURE — 95810 POLYSOM 6/> YRS 4/> PARAM: CPT | Performed by: FAMILY MEDICINE

## 2021-02-17 ENCOUNTER — OFFICE VISIT (OUTPATIENT)
Dept: MEDICAL GROUP | Facility: MEDICAL CENTER | Age: 36
End: 2021-02-17
Payer: COMMERCIAL

## 2021-02-17 VITALS
OXYGEN SATURATION: 95 % | BODY MASS INDEX: 28.06 KG/M2 | SYSTOLIC BLOOD PRESSURE: 116 MMHG | WEIGHT: 178.8 LBS | TEMPERATURE: 97.8 F | DIASTOLIC BLOOD PRESSURE: 82 MMHG | HEIGHT: 67 IN | HEART RATE: 90 BPM

## 2021-02-17 DIAGNOSIS — F41.9 ANXIETY AND DEPRESSION: ICD-10-CM

## 2021-02-17 DIAGNOSIS — F32.A ANXIETY AND DEPRESSION: ICD-10-CM

## 2021-02-17 PROCEDURE — 99213 OFFICE O/P EST LOW 20 MIN: CPT | Performed by: PHYSICIAN ASSISTANT

## 2021-02-17 RX ORDER — AMOXICILLIN 500 MG/1
CAPSULE ORAL
COMMUNITY
Start: 2020-11-25 | End: 2021-02-18

## 2021-02-17 RX ORDER — ESCITALOPRAM OXALATE 10 MG/1
10 TABLET ORAL DAILY
Qty: 30 TABLET | Refills: 1 | Status: SHIPPED | OUTPATIENT
Start: 2021-02-17 | End: 2021-04-14

## 2021-02-17 ASSESSMENT — ANXIETY QUESTIONNAIRES
6. BECOMING EASILY ANNOYED OR IRRITABLE: SEVERAL DAYS
5. BEING SO RESTLESS THAT IT IS HARD TO SIT STILL: SEVERAL DAYS
2. NOT BEING ABLE TO STOP OR CONTROL WORRYING: MORE THAN HALF THE DAYS
1. FEELING NERVOUS, ANXIOUS, OR ON EDGE: MORE THAN HALF THE DAYS
GAD7 TOTAL SCORE: 12
3. WORRYING TOO MUCH ABOUT DIFFERENT THINGS: MORE THAN HALF THE DAYS
7. FEELING AFRAID AS IF SOMETHING AWFUL MIGHT HAPPEN: SEVERAL DAYS
4. TROUBLE RELAXING: NEARLY EVERY DAY

## 2021-02-17 ASSESSMENT — PATIENT HEALTH QUESTIONNAIRE - PHQ9
CLINICAL INTERPRETATION OF PHQ2 SCORE: 2
SUM OF ALL RESPONSES TO PHQ QUESTIONS 1-9: 8
5. POOR APPETITE OR OVEREATING: 0 - NOT AT ALL

## 2021-02-17 ASSESSMENT — FIBROSIS 4 INDEX: FIB4 SCORE: 0.4

## 2021-02-18 PROBLEM — F32.A ANXIETY AND DEPRESSION: Status: ACTIVE | Noted: 2021-02-18

## 2021-02-18 PROBLEM — F41.9 ANXIETY AND DEPRESSION: Status: ACTIVE | Noted: 2021-02-18

## 2021-02-18 NOTE — PROGRESS NOTES
Subjective:   Jax Yang is a 35 y.o. male here today for f/u anxiety/insomnia    Anxiety and depression  Chronic, seeing a therapist every other week, mostly bothersome at night as it negatively affects his sleep. Has changed diet, exercise, no caffeine, still can't sleep. Willing to start medication at this point.  Depression Screening    Little interest or pleasure in doing things?  1 - several days   Feeling down, depressed , or hopeless? 1 - several days   Trouble falling or staying asleep, or sleeping too much?  2 - more than half the days   Feeling tired or having little energy?  2 - more than half the days   Poor appetite or overeating?  0 - not at all   Feeling bad about yourself - or that you are a failure or have let yourself or your family down? 1 - several days   Trouble concentrating on things, such as reading the newspaper or watching television? 1 - several days   Moving or speaking so slowly that other people could have noticed.  Or the opposite - being so fidgety or restless that you have been moving around a lot more than usual?  0 - not at all   Thoughts that you would be better off dead, or of hurting yourself?  0 - not at all   Patient Health Questionnaire Score: 8       If depressive symptoms identified deferred to follow up visit unless specifically addressed in assesment and plan.    Interpretation of PHQ-9 Total Score   Score Severity   1-4 No Depression   5-9 Mild Depression   10-14 Moderate Depression   15-19 Moderately Severe Depression   20-27 Severe Depression  MARYLOU-7 Questionnaire    Feeling nervous, anxious, or on edge: More than half the days  Not being able to sop or control worrying: More than half the days  Worrying too much about different things: More than half the days  Trouble relaxing: Nearly every day  Being so restless that it's hard to sit still: Several days  Becoming easily annoyed or irritable: Several days  Feeling afraid as if something awful might happen:  "Several days  Total: 12    Interpretation of MARYLOU 7 Total Score   Score Severity :  0-4 No Anxiety   5-9 Mild Anxiety  10-14 Moderate Anxiety  15-21 Severe Anxiety         Current medicines (including changes today)  Current Outpatient Medications   Medication Sig Dispense Refill   • escitalopram (LEXAPRO) 10 MG Tab Take 1 tablet by mouth every day. 30 tablet 1   • lisinopril (PRINIVIL) 10 MG Tab TAKE 1 TABLET BY MOUTH EVERY DAY 90 Tab 3   • Multiple Vitamins-Minerals (MULTIVITAMIN ADULT PO) Take 1 Tab by mouth every morning.     • FIBER PO Take 4 Tabs by mouth ONE-HALF HOUR AFTER LUNCH.       No current facility-administered medications for this visit.     He  has a past medical history of Hypertension, Lyme disease (2012), and Tobacco abuse (11/18/2014).    ROS   No fever/chills. No weight change. No headache/dizziness. No focal weakness. No sore throat, nasal congestion, ear pain. No chest pain, no shortness of breath, difficulty breathing. No n/v/d/c or abdominal pain. No urinary complaint. No rash or skin lesion. No joint pain or swelling.       Objective:     /82 (BP Location: Left arm, Patient Position: Sitting, BP Cuff Size: Adult long)   Pulse 90   Temp 36.6 °C (97.8 °F) (Temporal)   Ht 1.702 m (5' 7\")   Wt 81.1 kg (178 lb 12.8 oz)   SpO2 95%  Body mass index is 28 kg/m².   Physical Exam:  Constitutional: WDWN, NAD  Skin: Warm, dry, good turgor, no rashes in visible areas.  Psych: Alert and oriented x3, normal affect and mood.      Assessment and Plan:   The following treatment plan was discussed    1. Anxiety and depression    - escitalopram (LEXAPRO) 10 MG Tab; Take 1 tablet by mouth every day.  Dispense: 30 tablet; Refill: 1      Followup: f/u 2-3 weeks, consider increasing dose         "

## 2021-02-18 NOTE — ASSESSMENT & PLAN NOTE
Chronic, seeing a therapist every other week, mostly bothersome at night as it negatively affects his sleep. Has changed diet, exercise, no caffeine, still can't sleep. Willing to start medication at this point.  Depression Screening    Little interest or pleasure in doing things?  1 - several days   Feeling down, depressed , or hopeless? 1 - several days   Trouble falling or staying asleep, or sleeping too much?  2 - more than half the days   Feeling tired or having little energy?  2 - more than half the days   Poor appetite or overeating?  0 - not at all   Feeling bad about yourself - or that you are a failure or have let yourself or your family down? 1 - several days   Trouble concentrating on things, such as reading the newspaper or watching television? 1 - several days   Moving or speaking so slowly that other people could have noticed.  Or the opposite - being so fidgety or restless that you have been moving around a lot more than usual?  0 - not at all   Thoughts that you would be better off dead, or of hurting yourself?  0 - not at all   Patient Health Questionnaire Score: 8       If depressive symptoms identified deferred to follow up visit unless specifically addressed in assesment and plan.    Interpretation of PHQ-9 Total Score   Score Severity   1-4 No Depression   5-9 Mild Depression   10-14 Moderate Depression   15-19 Moderately Severe Depression   20-27 Severe Depression  MARYLOU-7 Questionnaire    Feeling nervous, anxious, or on edge: More than half the days  Not being able to sop or control worrying: More than half the days  Worrying too much about different things: More than half the days  Trouble relaxing: Nearly every day  Being so restless that it's hard to sit still: Several days  Becoming easily annoyed or irritable: Several days  Feeling afraid as if something awful might happen: Several days  Total: 12    Interpretation of MARYLOU 7 Total Score   Score Severity :  0-4 No Anxiety   5-9 Mild  Anxiety  10-14 Moderate Anxiety  15-21 Severe Anxiety

## 2021-03-16 ENCOUNTER — PRE-ADMISSION TESTING (OUTPATIENT)
Dept: ADMISSIONS | Facility: MEDICAL CENTER | Age: 36
End: 2021-03-16
Attending: SPECIALIST
Payer: COMMERCIAL

## 2021-03-16 DIAGNOSIS — Z01.812 PRE-OPERATIVE LABORATORY EXAMINATION: ICD-10-CM

## 2021-03-16 LAB
COVID ORDER STATUS COVID19: NORMAL
SARS-COV-2 RNA RESP QL NAA+PROBE: NOTDETECTED
SPECIMEN SOURCE: NORMAL

## 2021-03-16 PROCEDURE — U0003 INFECTIOUS AGENT DETECTION BY NUCLEIC ACID (DNA OR RNA); SEVERE ACUTE RESPIRATORY SYNDROME CORONAVIRUS 2 (SARS-COV-2) (CORONAVIRUS DISEASE [COVID-19]), AMPLIFIED PROBE TECHNIQUE, MAKING USE OF HIGH THROUGHPUT TECHNOLOGIES AS DESCRIBED BY CMS-2020-01-R: HCPCS

## 2021-03-16 PROCEDURE — U0005 INFEC AGEN DETEC AMPLI PROBE: HCPCS

## 2021-03-16 PROCEDURE — C9803 HOPD COVID-19 SPEC COLLECT: HCPCS

## 2021-03-18 ENCOUNTER — ANESTHESIA (OUTPATIENT)
Dept: SURGERY | Facility: MEDICAL CENTER | Age: 36
End: 2021-03-18
Payer: COMMERCIAL

## 2021-03-18 ENCOUNTER — HOSPITAL ENCOUNTER (OUTPATIENT)
Facility: MEDICAL CENTER | Age: 36
End: 2021-03-18
Attending: SPECIALIST | Admitting: SPECIALIST
Payer: COMMERCIAL

## 2021-03-18 ENCOUNTER — ANESTHESIA EVENT (OUTPATIENT)
Dept: SURGERY | Facility: MEDICAL CENTER | Age: 36
End: 2021-03-18
Payer: COMMERCIAL

## 2021-03-18 VITALS
OXYGEN SATURATION: 95 % | BODY MASS INDEX: 27.75 KG/M2 | WEIGHT: 176.81 LBS | TEMPERATURE: 97 F | SYSTOLIC BLOOD PRESSURE: 117 MMHG | HEART RATE: 92 BPM | RESPIRATION RATE: 16 BRPM | HEIGHT: 67 IN | DIASTOLIC BLOOD PRESSURE: 67 MMHG

## 2021-03-18 PROCEDURE — 160039 HCHG SURGERY MINUTES - EA ADDL 1 MIN LEVEL 3: Performed by: SPECIALIST

## 2021-03-18 PROCEDURE — 700102 HCHG RX REV CODE 250 W/ 637 OVERRIDE(OP): Performed by: SPECIALIST

## 2021-03-18 PROCEDURE — 501838 HCHG SUTURE GENERAL: Performed by: SPECIALIST

## 2021-03-18 PROCEDURE — 700111 HCHG RX REV CODE 636 W/ 250 OVERRIDE (IP): Performed by: ANESTHESIOLOGY

## 2021-03-18 PROCEDURE — 160028 HCHG SURGERY MINUTES - 1ST 30 MINS LEVEL 3: Performed by: SPECIALIST

## 2021-03-18 PROCEDURE — A9270 NON-COVERED ITEM OR SERVICE: HCPCS | Performed by: ANESTHESIOLOGY

## 2021-03-18 PROCEDURE — 700105 HCHG RX REV CODE 258: Performed by: ANESTHESIOLOGY

## 2021-03-18 PROCEDURE — 160036 HCHG PACU - EA ADDL 30 MINS PHASE I: Performed by: SPECIALIST

## 2021-03-18 PROCEDURE — 160046 HCHG PACU - 1ST 60 MINS PHASE II: Performed by: SPECIALIST

## 2021-03-18 PROCEDURE — 160025 RECOVERY II MINUTES (STATS): Performed by: SPECIALIST

## 2021-03-18 PROCEDURE — 700101 HCHG RX REV CODE 250: Performed by: SPECIALIST

## 2021-03-18 PROCEDURE — 502573 HCHG PACK, ENT: Performed by: SPECIALIST

## 2021-03-18 PROCEDURE — A9270 NON-COVERED ITEM OR SERVICE: HCPCS | Performed by: SPECIALIST

## 2021-03-18 PROCEDURE — 700105 HCHG RX REV CODE 258: Performed by: SPECIALIST

## 2021-03-18 PROCEDURE — 700111 HCHG RX REV CODE 636 W/ 250 OVERRIDE (IP): Performed by: SPECIALIST

## 2021-03-18 PROCEDURE — 700101 HCHG RX REV CODE 250: Performed by: ANESTHESIOLOGY

## 2021-03-18 PROCEDURE — 160002 HCHG RECOVERY MINUTES (STAT): Performed by: SPECIALIST

## 2021-03-18 PROCEDURE — 160035 HCHG PACU - 1ST 60 MINS PHASE I: Performed by: SPECIALIST

## 2021-03-18 PROCEDURE — 500380 HCHG DRAIN, PENROSE 1/4X12: Performed by: SPECIALIST

## 2021-03-18 PROCEDURE — 160048 HCHG OR STATISTICAL LEVEL 1-5: Performed by: SPECIALIST

## 2021-03-18 PROCEDURE — 160009 HCHG ANES TIME/MIN: Performed by: SPECIALIST

## 2021-03-18 PROCEDURE — 700102 HCHG RX REV CODE 250 W/ 637 OVERRIDE(OP): Performed by: ANESTHESIOLOGY

## 2021-03-18 RX ORDER — CEFDINIR 300 MG/1
300 CAPSULE ORAL 2 TIMES DAILY
Qty: 10 CAPSULE | Refills: 0 | Status: SHIPPED | OUTPATIENT
Start: 2021-03-18 | End: 2021-06-02

## 2021-03-18 RX ORDER — HYDROMORPHONE HYDROCHLORIDE 1 MG/ML
0.1 INJECTION, SOLUTION INTRAMUSCULAR; INTRAVENOUS; SUBCUTANEOUS
Status: DISCONTINUED | OUTPATIENT
Start: 2021-03-18 | End: 2021-03-18 | Stop reason: HOSPADM

## 2021-03-18 RX ORDER — BACITRACIN ZINC 500 [USP'U]/G
OINTMENT TOPICAL
Status: DISCONTINUED
Start: 2021-03-18 | End: 2021-03-18 | Stop reason: HOSPADM

## 2021-03-18 RX ORDER — MEPERIDINE HYDROCHLORIDE 25 MG/ML
12.5 INJECTION INTRAMUSCULAR; INTRAVENOUS; SUBCUTANEOUS
Status: DISCONTINUED | OUTPATIENT
Start: 2021-03-18 | End: 2021-03-18 | Stop reason: HOSPADM

## 2021-03-18 RX ORDER — EPINEPHRINE 1 MG/ML(1)
AMPUL (ML) INJECTION
Status: DISCONTINUED
Start: 2021-03-18 | End: 2021-03-18 | Stop reason: HOSPADM

## 2021-03-18 RX ORDER — OXYCODONE HCL 5 MG/5 ML
5 SOLUTION, ORAL ORAL
Status: COMPLETED | OUTPATIENT
Start: 2021-03-18 | End: 2021-03-18

## 2021-03-18 RX ORDER — HYDROMORPHONE HYDROCHLORIDE 1 MG/ML
0.4 INJECTION, SOLUTION INTRAMUSCULAR; INTRAVENOUS; SUBCUTANEOUS
Status: DISCONTINUED | OUTPATIENT
Start: 2021-03-18 | End: 2021-03-18 | Stop reason: HOSPADM

## 2021-03-18 RX ORDER — OXYCODONE HCL 5 MG/5 ML
10 SOLUTION, ORAL ORAL
Status: COMPLETED | OUTPATIENT
Start: 2021-03-18 | End: 2021-03-18

## 2021-03-18 RX ORDER — HYDROMORPHONE HYDROCHLORIDE 1 MG/ML
0.2 INJECTION, SOLUTION INTRAMUSCULAR; INTRAVENOUS; SUBCUTANEOUS
Status: DISCONTINUED | OUTPATIENT
Start: 2021-03-18 | End: 2021-03-18 | Stop reason: HOSPADM

## 2021-03-18 RX ORDER — LIDOCAINE HYDROCHLORIDE AND EPINEPHRINE 10; 10 MG/ML; UG/ML
INJECTION, SOLUTION INFILTRATION; PERINEURAL
Status: DISCONTINUED | OUTPATIENT
Start: 2021-03-18 | End: 2021-03-18 | Stop reason: HOSPADM

## 2021-03-18 RX ORDER — DEXAMETHASONE SODIUM PHOSPHATE 4 MG/ML
INJECTION, SOLUTION INTRA-ARTICULAR; INTRALESIONAL; INTRAMUSCULAR; INTRAVENOUS; SOFT TISSUE PRN
Status: DISCONTINUED | OUTPATIENT
Start: 2021-03-18 | End: 2021-03-18 | Stop reason: SURG

## 2021-03-18 RX ORDER — DIPHENHYDRAMINE HYDROCHLORIDE 50 MG/ML
12.5 INJECTION INTRAMUSCULAR; INTRAVENOUS
Status: DISCONTINUED | OUTPATIENT
Start: 2021-03-18 | End: 2021-03-18 | Stop reason: HOSPADM

## 2021-03-18 RX ORDER — SODIUM CHLORIDE, SODIUM LACTATE, POTASSIUM CHLORIDE, CALCIUM CHLORIDE 600; 310; 30; 20 MG/100ML; MG/100ML; MG/100ML; MG/100ML
INJECTION, SOLUTION INTRAVENOUS CONTINUOUS
Status: DISCONTINUED | OUTPATIENT
Start: 2021-03-18 | End: 2021-03-18

## 2021-03-18 RX ORDER — ONDANSETRON 2 MG/ML
INJECTION INTRAMUSCULAR; INTRAVENOUS PRN
Status: DISCONTINUED | OUTPATIENT
Start: 2021-03-18 | End: 2021-03-18 | Stop reason: SURG

## 2021-03-18 RX ORDER — CEFAZOLIN SODIUM 1 G/3ML
INJECTION, POWDER, FOR SOLUTION INTRAMUSCULAR; INTRAVENOUS PRN
Status: DISCONTINUED | OUTPATIENT
Start: 2021-03-18 | End: 2021-03-18 | Stop reason: SURG

## 2021-03-18 RX ORDER — SODIUM CHLORIDE, SODIUM LACTATE, POTASSIUM CHLORIDE, CALCIUM CHLORIDE 600; 310; 30; 20 MG/100ML; MG/100ML; MG/100ML; MG/100ML
INJECTION, SOLUTION INTRAVENOUS CONTINUOUS
Status: DISCONTINUED | OUTPATIENT
Start: 2021-03-18 | End: 2021-03-18 | Stop reason: HOSPADM

## 2021-03-18 RX ORDER — BACITRACIN ZINC 500 [USP'U]/G
OINTMENT TOPICAL
Status: DISCONTINUED | OUTPATIENT
Start: 2021-03-18 | End: 2021-03-18 | Stop reason: HOSPADM

## 2021-03-18 RX ORDER — EPINEPHRINE 1 MG/ML(1)
AMPUL (ML) INJECTION
Status: DISCONTINUED | OUTPATIENT
Start: 2021-03-18 | End: 2021-03-18 | Stop reason: HOSPADM

## 2021-03-18 RX ORDER — LIDOCAINE HYDROCHLORIDE 10 MG/ML
INJECTION, SOLUTION INFILTRATION; PERINEURAL
Status: DISCONTINUED
Start: 2021-03-18 | End: 2021-03-18 | Stop reason: HOSPADM

## 2021-03-18 RX ORDER — MOXIFLOXACIN 5 MG/ML
SOLUTION/ DROPS OPHTHALMIC
Status: DISCONTINUED
Start: 2021-03-18 | End: 2021-03-18 | Stop reason: HOSPADM

## 2021-03-18 RX ORDER — HALOPERIDOL 5 MG/ML
1 INJECTION INTRAMUSCULAR
Status: DISCONTINUED | OUTPATIENT
Start: 2021-03-18 | End: 2021-03-18 | Stop reason: HOSPADM

## 2021-03-18 RX ORDER — SODIUM CHLORIDE, SODIUM LACTATE, POTASSIUM CHLORIDE, CALCIUM CHLORIDE 600; 310; 30; 20 MG/100ML; MG/100ML; MG/100ML; MG/100ML
INJECTION, SOLUTION INTRAVENOUS
Status: DISCONTINUED | OUTPATIENT
Start: 2021-03-18 | End: 2021-03-18 | Stop reason: SURG

## 2021-03-18 RX ORDER — ONDANSETRON 2 MG/ML
4 INJECTION INTRAMUSCULAR; INTRAVENOUS
Status: COMPLETED | OUTPATIENT
Start: 2021-03-18 | End: 2021-03-18

## 2021-03-18 RX ADMIN — SODIUM CHLORIDE, POTASSIUM CHLORIDE, SODIUM LACTATE AND CALCIUM CHLORIDE: 600; 310; 30; 20 INJECTION, SOLUTION INTRAVENOUS at 10:15

## 2021-03-18 RX ADMIN — SODIUM CHLORIDE, POTASSIUM CHLORIDE, SODIUM LACTATE AND CALCIUM CHLORIDE: 600; 310; 30; 20 INJECTION, SOLUTION INTRAVENOUS at 08:46

## 2021-03-18 RX ADMIN — DEXAMETHASONE SODIUM PHOSPHATE 2 MG: 4 INJECTION, SOLUTION INTRA-ARTICULAR; INTRALESIONAL; INTRAMUSCULAR; INTRAVENOUS; SOFT TISSUE at 10:25

## 2021-03-18 RX ADMIN — PROPOFOL 200 MG: 10 INJECTION, EMULSION INTRAVENOUS at 10:02

## 2021-03-18 RX ADMIN — ROCURONIUM BROMIDE 50 MG: 10 INJECTION, SOLUTION INTRAVENOUS at 10:03

## 2021-03-18 RX ADMIN — FENTANYL CITRATE 50 MCG: 50 INJECTION, SOLUTION INTRAMUSCULAR; INTRAVENOUS at 10:02

## 2021-03-18 RX ADMIN — FENTANYL CITRATE 25 MCG: 50 INJECTION, SOLUTION INTRAMUSCULAR; INTRAVENOUS at 12:38

## 2021-03-18 RX ADMIN — ONDANSETRON 4 MG: 2 INJECTION INTRAMUSCULAR; INTRAVENOUS at 10:25

## 2021-03-18 RX ADMIN — ONDANSETRON 4 MG: 2 INJECTION INTRAMUSCULAR; INTRAVENOUS at 12:53

## 2021-03-18 RX ADMIN — FENTANYL CITRATE 50 MCG: 50 INJECTION, SOLUTION INTRAMUSCULAR; INTRAVENOUS at 11:36

## 2021-03-18 RX ADMIN — FENTANYL CITRATE 50 MCG: 50 INJECTION, SOLUTION INTRAMUSCULAR; INTRAVENOUS at 10:28

## 2021-03-18 RX ADMIN — POVIDONE IODINE 15 ML: 100 SOLUTION TOPICAL at 08:36

## 2021-03-18 RX ADMIN — FENTANYL CITRATE 100 MCG: 50 INJECTION, SOLUTION INTRAMUSCULAR; INTRAVENOUS at 10:15

## 2021-03-18 RX ADMIN — OXYCODONE HYDROCHLORIDE 10 MG: 5 SOLUTION ORAL at 12:36

## 2021-03-18 RX ADMIN — CEFAZOLIN 2 G: 330 INJECTION, POWDER, FOR SOLUTION INTRAMUSCULAR; INTRAVENOUS at 10:12

## 2021-03-18 ASSESSMENT — FIBROSIS 4 INDEX: FIB4 SCORE: 0.4

## 2021-03-18 ASSESSMENT — PAIN SCALES - GENERAL: PAIN_LEVEL: 1

## 2021-03-18 ASSESSMENT — PAIN DESCRIPTION - PAIN TYPE
TYPE: SURGICAL PAIN

## 2021-03-18 NOTE — ANESTHESIA PROCEDURE NOTES
Airway    Date/Time: 3/18/2021 10:08 AM  Performed by: Al Avalos M.D.  Authorized by: Al Avalos M.D.     Location:  OR  Urgency:  Elective  Indications for Airway Management:  Anesthesia      Spontaneous Ventilation: absent    Sedation Level:  Deep  Preoxygenated: Yes    Patient Position:  Sniffing  Final Airway Type:  Endotracheal airway  Final Endotracheal Airway:  ETT  Cuffed: Yes    Technique Used for Successful ETT Placement:  Direct laryngoscopy    Insertion Site:  Oral  Blade Type:  Bassett  Laryngoscope Blade/Videolaryngoscope Blade Size:  3  ETT Size (mm):  7.5  Measured from:  Teeth  ETT to Teeth (cm):  23  Placement Verified by: auscultation and capnometry    Cormack-Lehane Classification:  Grade I - full view of glottis  Number of Attempts at Approach:  1

## 2021-03-18 NOTE — OR NURSING
1146  RECEIVED PATIENT FROM OR.  REPORT FROM DR. RENDON.  ORAL AIRWAY IN PLACE.  RESPIRATIONS ARE EVEN AND UNLABORED.  GAUZE AND BAND AIDS TO LEFT EAR ARE CDI.      1205  REPORT TO LYNNE RN.    1240  RECEIVED REPORT FROM LYNNE QUINTANA.  RESUMED CARE OF PATIENT.      1253  MEDICATED WITH IV ZOFRAN FOR C/O NAUSEA.    1300  PHASE 2.    1358  UP AMBULATING TO THE BATHROOM.    1400  DISCHARGED.  DISCHARGE INSTRUCTIONS GIVEN TO PATIENT AND HIS WIFE DAVID.  A VERBAL UNDERSTANDING OF ALL INSTRUCTIONS WAS STATED.  PATIENT TAKING PO, VOIDING AND AMBULATING WITHOUT DIFFICULTY.  PATIENT STATES HE IS READY TO GO HOME.

## 2021-03-18 NOTE — OR SURGEON
Immediate Post OP Note    PreOp Diagnosis: marginal perforation left tympanic membrane    PostOp Diagnosis: same    Procedure(s):  LEFT TYMPANOPLASTY; AUTOGRAFT,CARTILAGE,EAR- EAR CARTILAGE, AUTOGENOUS, EAR. - Wound Class: Clean Contaminated    Surgeon(s):  Anton Bruno M.D.    Anesthesiologist/Type of Anesthesia:  Anesthesiologist: Al Avalos M.D./General    Surgical Staff:  Circulator: Yaz Dunham R.N.; Castro Manzo R.N.  Relief Scrub: Cheryl Guajardo  Scrub Person: Eugenio Enriquez    Specimens removed if any:  * No specimens in log *    Estimated Blood Loss: <5ML    Findings: CARTILAGE AND FASCIA GRAFTS -- PHOTOS; OC INTACT    Complications: none      3/18/2021 11:43 AM Anton Bruno M.D.

## 2021-03-18 NOTE — DISCHARGE INSTRUCTIONS
ACTIVITY: Rest and take it easy for the first 24 hours.  A responsible adult is recommended to remain with you during that time.  It is normal to feel sleepy.  We encourage you to not do anything that requires balance, judgment or coordination.    MILD FLU-LIKE SYMPTOMS ARE NORMAL. YOU MAY EXPERIENCE GENERALIZED MUSCLE ACHES, THROAT IRRITATION, HEADACHE AND/OR SOME NAUSEA.    FOR 24 HOURS DO NOT:  Drive, operate machinery or run household appliances.  Drink beer or alcoholic beverages.   Make important decisions or sign legal documents.    SPECIAL INSTRUCTIONS: *SEE TYMPANOPLASTY INSTRUCTION SHEET **    DIET: To avoid nausea, slowly advance diet as tolerated, avoiding spicy or greasy foods for the first day.  Add more substantial food to your diet according to your physician's instructions.  Babies can be fed formula or breast milk as soon as they are hungry.  INCREASE FLUIDS AND FIBER TO AVOID CONSTIPATION.    SURGICAL DRESSING/BATHING: *KEEP EAR CLEAN AND DRY**    FOLLOW-UP APPOINTMENT:  A follow-up appointment should be arranged with your doctor in *FOLLOW UP WITH DR. RHODES**; call to schedule.    You should CALL YOUR PHYSICIAN if you develop:  Fever greater than 101 degrees F.  Pain not relieved by medication, or persistent nausea or vomiting.  Excessive bleeding (blood soaking through dressing) or unexpected drainage from the wound.  Extreme redness or swelling around the incision site, drainage of pus or foul smelling drainage.  Inability to urinate or empty your bladder within 8 hours.  Problems with breathing or chest pain.    You should call 911 if you develop problems with breathing or chest pain.  If you are unable to contact your doctor or surgical center, you should go to the nearest emergency room or urgent care center.  Physician's telephone #: *DR. RHODES [phone]    If any questions arise, call your doctor.  If your doctor is not available, please feel free to call the Surgical Center at  [phone]. The Contact Center is open Monday through Friday 7AM to 5PM and may speak to a nurse at [phone], or toll free at [phone].     A registered nurse may call you a few days after your surgery to see how you are doing after your procedure.    MEDICATIONS: Resume taking daily medication.  Take prescribed pain medication with food.  If no medication is prescribed, you may take non-aspirin pain medication if needed.  PAIN MEDICATION CAN BE VERY CONSTIPATING.  Take a stool softener or laxative such as senokot, pericolace, or milk of magnesia if needed.    Prescription given for *OMNICEF**.  Last pain medication given at **12:30 PM*.    If your physician has prescribed pain medication that includes Acetaminophen (Tylenol), do not take additional Acetaminophen (Tylenol) while taking the prescribed medication.    Depression / Suicide Risk    As you are discharged from this [facility] facility, it is important to learn how to keep safe from harming yourself.    Recognize the warning signs:  · Abrupt changes in personality, positive or negative- including increase in energy   · Giving away possessions  · Change in eating patterns- significant weight changes-  positive or negative  · Change in sleeping patterns- unable to sleep or sleeping all the time   · Unwillingness or inability to communicate  · Depression  · Unusual sadness, discouragement and loneliness  · Talk of wanting to die  · Neglect of personal appearance   · Rebelliousness- reckless behavior  · Withdrawal from people/activities they love  · Confusion- inability to concentrate     If you or a loved one observes any of these behaviors or has concerns about self-harm, here's what you can do:  · Talk about it- your feelings and reasons for harming yourself  · Remove any means that you might use to hurt yourself (examples: pills, rope, extension cords, firearm)  · Get professional help from the community (Mental Health, Substance Abuse,  psychological counseling)  · Do not be alone:Call your Safe Contact- someone whom you trust who will be there for you.  · Call your local CRISIS HOTLINE 400-7416 or 106-048-9138  · Call your local Children's Mobile Crisis Response Team [place] (518) 981-9666 or www.[employer]  · Call the toll free National Suicide Prevention Hotlines   · National Suicide Prevention Lifeline 527-774-SQZQ (1463)  · National Hope Line Network 800-SUICIDE (571-9131)

## 2021-03-18 NOTE — ANESTHESIA POSTPROCEDURE EVALUATION
Patient: Jax Yang    Procedure Summary     Date: 03/18/21 Room / Location: [facility] ROOM 22 / SURGERY SAME DAY [facility]    Anesthesia Start: 0959 Anesthesia Stop: 1147    Procedures:       TYMPANOPLASTY (Left Ear)      AUTOGRAFT,CARTILAGE,EAR,TO NOSE OR EAR - EAR CARTILAGE, AUTOGENOUS, NOSE/EAR. (Left Ear) Diagnosis: (H72.02, H90.12)    Surgeons: Anton Bruno M.D. Responsible Provider: Al Avalos M.D.    Anesthesia Type: general ASA Status: 2          Final Anesthesia Type: general  Last vitals  BP   Blood Pressure: 142/90    Temp   36.6 °C (97.8 °F)    Pulse   62   Resp   20    SpO2   100 %      Anesthesia Post Evaluation    Patient location during evaluation: PACU  Patient participation: complete - patient participated  Level of consciousness: awake and alert  Pain score: 1    Airway patency: patent  Anesthetic complications: no  Cardiovascular status: adequate and hemodynamically stable  Respiratory status: acceptable  Hydration status: acceptable    PONV: none          No complications documented.     Nurse Pain Score: 0 (NPRS)

## 2021-03-18 NOTE — OP REPORT
DATE OF SERVICE:  03/18/2021     SURGEON:  Antno Brnuo MD     ASSISTANT:  None.     ANESTHESIA:  General given per endotracheal tube.     ANESTHESIOLOGIST:  Al Avalos MD     PREOPERATIVE DIAGNOSIS:  Left tympanic membrane perforation, marginal.     POSTOPERATIVE DIAGNOSIS:  Left tympanic membrane perforation, marginal.     PROCEDURE PERFORMED:  Left tympanoplasty without mastoidectomy and without   ossicular chain reconstruction, endoscopic technique; ear cartilage graft,   otologists.     INDICATIONS:  The patient is a 35-year-old man with a history of prior TM   perforation and underwent a previous tympanoplasty surgery by Dr. Vann   approximately 14 months ago.  He has had ear pain as well as occasional   drainage and infection.  Examination in December showed a conductive hearing   loss in the left ear with an SRT of 25 dB and air bone gap ranging from 10-30   dB, worse in the low frequencies.  He presents now for repeat surgical   intervention on elective basis, this time with the ear cartilage graft.     DESCRIPTION OF PROCEDURE:  Patient was brought to the operating room and   placed in supine position on the operating table.  General anesthesia was   induced and the patient was endotracheally intubated without difficulty.  Eyes   were protected with taping and the table was turned 180 degrees.  Head is   rotated towards the right side and attention was directed to the left ear,   which had been marked preoperatively.  Approximately 5-6 mL of 1% Xylocaine   with 1:100,000 units of epinephrine solution was used to infiltrate along the   postauricular sulcus as well as along the posterior aspect of the pinna and   the area of the cavum conchal cartilage.  An additional 1 to 1.5 mL of local   anesthetic solution was further used to infiltrate along the anterior pinna in   the region of the conchal cartilage and cavum conchal cartilage areas.  There   was a scar present along the tragus from prior  surgical intervention by Dr. Vann.  Cerumen was cleared from the external canal using the operating   microscope for visualization.  Approximately 0.75-1 mL of local anesthetic   solution was then used to infiltrate along the external auditory canal in 4   quadrants in a perimeatal fashion.  The patient's left ear was then prepped   and draped in the usual fashion for ear surgery.     Operating microscope was used to first visualize the ear.  Betadine was used   to irrigate the canal, following clearance of additional ceruminous debris.    He was noted to have a posterior inferior quadrant TM perforation extending to   the margin along the posterior canal wall.     The 0-degree otologic endoscope was then used for visualization to allow for   full visualization of the area of the perforation along the inferior and   posterior aspects of the canal as he has a prominent anterior inferior   quadrant canal wall bulge.  With visualization of the perforation with the   otoscope, both Greenwood needle and a sickle knife were used to separate the   epithelial margin from the area of the TM perforation with angled-cup forceps   being used to remove the epithelial margin circumferentially.  The area of the   marginal component of the perforation along the posterior aspect was taken   down to the canal wall skin.  The straight Downey blade was then utilized to   make posterior canal wall cuts at approximately 5 o'clock and 12 o'clock   positions.  A transverse canal wall cut was then made with an angled Downey   blade approximately 3-4 mm lateral to the annulus.  Tympanomeatal flap was   then developed and taken down to the level of the annular sulcus using a   weapon.     The annular sulcus was carefully elevated along the posterior canal wall, but   appeared to be somewhat deficient along the inferior aspect of the   tympanomeatal flap in the area of the marginal perforation.  The middle ear   was noted to have adhesions and  fibrosis present along the promontory and TM   area which were then lysed with a sickle knife.  The incudostapedial joint was   then identified and fibrous bands and adhesions were also lysed with a sickle   knife around the incudostapedial joint to free it up.  Palpation of the   ossicular chain was then undertaken both along the malleus and incus with   mobility being noted.  Cotton ball with epinephrine was then applied to the   tympanomeatal flap area.     Attention was then directed postauricularly.  He has a large postauricular   incision, but this was somewhat posterior.  A separate smaller superior   postauricular incision was then made and carried down through the skin and   subcutaneous tissue down to the level of temporalis fascia superiorly.  The   bleeding was controlled with electrocautery.  An additional 0.25 mL of local   anesthetic solution was used to infiltrate deep to the temporalis fascia.  A   segment of the temporalis fascia just above the pinna anteriorly was then   harvested for graft use.  The temporalis fascia was placed in a fascial press   and then air dried for later graft use.     At this point, incision was made along the posterior aspect of the pinna   through the soft tissue and perichondrium up to the area of the conchal   cartilage.  A soft tissue flap was then elevated off of the cartilage which is   posteriorly based.  Once the cartilage was exposed, an approximate 1x0.75 cm   ovoid segment of cartilage was excised for graft use.  Care was taken to avoid   fenestration of the anterior skin of the pinna.  Once the cartilage was   removed, it was placed in saline for graft use.  Bleeding was controlled with   electrocautery as necessary.  The soft tissue flap over the graft donor site   was reapproximated anatomically using interrupted stitches of 4-0 Vicryl.     At this point, the postauricular incision was closed with interrupted buried   subcutaneous stitches with a 4-0 Vicryl.   A running simple stitch of 6-0   Prolene was then used for skin approximation.     At this point, attention was redirected to the ear.  Cotton balls with   epinephrine were removed.  The tympanomeatal flap was further reflected   anteriorly to allow for better visualization.  The previously harvested   cartilage graft was divided to an approximate 3-4 mm segment, which was used   as a cartilage graft along the marginal perforation area.  The cartilage was   thinned with a Kelliher blade and then positioned transcanal into the middle ear   in the posterior inferior quadrant of the TM.  The cartilage graft was   elevated posteriorly and Gelfoam was packed deep to the cartilage graft to   bring it into apposition with the TM remnant.  The graft was then repositioned   anatomically and the tympanomeatal flap was then reflected posteriorly onto   the posterior canal to check positioning of the cartilage.  There was noted to   be satisfactory positioning of the cartilage to cover the area of the   perforation.  It was elected to again reflect the tympanomeatal flap   anteriorly.  The previously harvested fascial graft was then trimmed and a   portion was placed deep to the tympanomeatal flap and advanced over the area   of the cartilage and tucked under the area of the anterior TM perforation   remnant.  Once positioned, the tympanomeatal flap and posterior aspect of the   graft were then reflected posteriorly onto the posterior canal.  There was   noted to be good coverage of the area of the TM perforation.  A small portion   of the leftover temporalis fascia was positioned along the inferior sulcus and   draped along the posterior canal in the area of the previous marginal   perforation as an overlay sandwich-type graft.  Bacitracin ointment was then   used to fill the external canal as a packing material.  Cotton ball and   Band-Aid dressings were then applied to the ear canal.  The postauricular   incision was coated  with antibiotic ointment and then Band-Aid was also placed   for dressing.  The procedure was then terminated.  The patient was   subsequently awakened from anesthesia and extubated without difficulty.  He   was taken from the operating room to the recovery area, awake, breathing on   his own in stable condition.  There were no apparent complications.  Blood   loss during the procedure was felt to be less than 5 mL.        ______________________________  MD GENE NORIEGA/MIGUEL/JOCELYN    DD:  03/18/2021 12:00  DT:  03/18/2021 12:46    Job#:  [id number]

## 2021-03-18 NOTE — ANESTHESIA TIME REPORT
Anesthesia Start and Stop Event Times     Date Time Event    3/18/2021 0908 Ready for Procedure     0959 Anesthesia Start     1147 Anesthesia Stop        Responsible Staff  03/18/21    Name Role Begin End    Al Avalos M.D. Anesth 0959 1147        Preop Diagnosis (Free Text):  Pre-op Diagnosis     H72.02, H90.12        Preop Diagnosis (Codes):    Post op Diagnosis  Central perforation of tympanic membrane of left ear      Premium Reason  Non-Premium    Comments:                                                                  tympanoplasty

## 2021-04-14 DIAGNOSIS — F32.A ANXIETY AND DEPRESSION: ICD-10-CM

## 2021-04-14 DIAGNOSIS — F41.9 ANXIETY AND DEPRESSION: ICD-10-CM

## 2021-04-14 RX ORDER — ESCITALOPRAM OXALATE 10 MG/1
10 TABLET ORAL DAILY
Qty: 30 TABLET | Refills: 2 | Status: SHIPPED | OUTPATIENT
Start: 2021-04-14 | End: 2021-05-11 | Stop reason: SDUPTHER

## 2021-05-11 DIAGNOSIS — F41.9 ANXIETY AND DEPRESSION: ICD-10-CM

## 2021-05-11 DIAGNOSIS — F32.A ANXIETY AND DEPRESSION: ICD-10-CM

## 2021-05-11 RX ORDER — ESCITALOPRAM OXALATE 10 MG/1
10 TABLET ORAL DAILY
Qty: 30 TABLET | Refills: 0 | Status: SHIPPED | OUTPATIENT
Start: 2021-05-11 | End: 2021-06-02 | Stop reason: SDUPTHER

## 2021-05-11 RX ORDER — LISINOPRIL 10 MG/1
10 TABLET ORAL
Qty: 30 TABLET | Refills: 0 | Status: SHIPPED | OUTPATIENT
Start: 2021-05-11 | End: 2021-09-10

## 2021-05-11 NOTE — TELEPHONE ENCOUNTER
Please advise patient that we did send refills. He may need to pay out of pocket depending on insurance. Thanks! Andreina

## 2021-06-02 ENCOUNTER — OFFICE VISIT (OUTPATIENT)
Dept: MEDICAL GROUP | Facility: MEDICAL CENTER | Age: 36
End: 2021-06-02
Payer: COMMERCIAL

## 2021-06-02 VITALS
BODY MASS INDEX: 28.5 KG/M2 | HEART RATE: 75 BPM | HEIGHT: 67 IN | DIASTOLIC BLOOD PRESSURE: 74 MMHG | WEIGHT: 181.6 LBS | OXYGEN SATURATION: 94 % | SYSTOLIC BLOOD PRESSURE: 108 MMHG | TEMPERATURE: 97.7 F

## 2021-06-02 DIAGNOSIS — F41.9 ANXIETY AND DEPRESSION: ICD-10-CM

## 2021-06-02 DIAGNOSIS — F32.A ANXIETY AND DEPRESSION: ICD-10-CM

## 2021-06-02 DIAGNOSIS — S62.316D DISPLACED FRACTURE OF BASE OF FIFTH METACARPAL BONE, RIGHT HAND, SUBSEQUENT ENCOUNTER FOR FRACTURE WITH ROUTINE HEALING: ICD-10-CM

## 2021-06-02 PROCEDURE — 99213 OFFICE O/P EST LOW 20 MIN: CPT | Performed by: PHYSICIAN ASSISTANT

## 2021-06-02 RX ORDER — ESCITALOPRAM OXALATE 10 MG/1
10 TABLET ORAL DAILY
Qty: 90 TABLET | Refills: 3 | Status: SHIPPED | OUTPATIENT
Start: 2021-06-02 | End: 2021-06-21

## 2021-06-02 ASSESSMENT — FIBROSIS 4 INDEX: FIB4 SCORE: 0.4

## 2021-06-02 NOTE — LETTER
June 2, 2021        RE: Jax Yang          Patient seen today to discuss continued right hand pain. Patient is left hand dominant. He was injured at work June 30, 2020 and had displaced fracture 5th metatarsal. He was placed in a splint and then completed 6 weeks of PT. He was advised that pain would resolve with time. Unfortunately patient is still experiencing constantly in the area of the fracture. It hurts more when he has to use it and feels this negatively affects his work. I advise patient have an evaluation with a hand specialist for follow up on continued pain.          Thank you for your time,            Andreina Chang P.A.-C.       [employer] Safety Interventions

## 2021-06-02 NOTE — LETTER
June 2, 2021      Jax Yang      Patient seen today to discuss continued right hand pain. Patient is left hand dominant. He was injured at work June 30, 2020 and had displaced fracture 5th metatarsal. He was placed in a splint and then completed 6 weeks of PT. He was advised that pain would resolve with time. Unfortunately patient is still experiencing pain constantly in the area of the fracture. It hurts more when he has to use it and feels this negatively affects his work. I advise patient have an evaluation with a hand specialist for follow up on continued pain.          Thank you for your time,            Andreina Chang P.A.-C.                            Andreina Chang P.A.-C.

## 2021-06-02 NOTE — PROGRESS NOTES
"Subjective:   Jax Yang is a 35 y.o. male here today for f/u lexapro and hand pain    Displaced fracture of base of fifth metacarpal bone, right hand, subsequent encounter for fracture with routine healing  One year ago. Worker's comp. Did have splint/cast. No surgery. Did 6 weeks of PT. Still having pain. Would like letter to be able to reopen the worker's comp case.    Anxiety and depression  Doing well on lexapro, would like to continue current       Current medicines (including changes today)  Current Outpatient Medications   Medication Sig Dispense Refill   • escitalopram (LEXAPRO) 10 MG Tab Take 1 tablet by mouth every day. 90 tablet 3   • lisinopril (PRINIVIL) 10 MG Tab Take 1 tablet by mouth every day. 30 tablet 0     No current facility-administered medications for this visit.     He  has a past medical history of Hypertension, Lyme disease (2012), Psychiatric problem, and Tobacco abuse (11/18/2014).    ROS   No fever/chills. No weight change. No headache/dizziness. No focal weakness. No sore throat, nasal congestion, ear pain. No chest pain, no shortness of breath, difficulty breathing. No n/v/d/c or abdominal pain. No urinary complaint. No rash or skin lesion.      Objective:     /74 (BP Location: Right arm, Patient Position: Sitting, BP Cuff Size: Adult long)   Pulse 75   Temp 36.5 °C (97.7 °F) (Temporal)   Ht 1.702 m (5' 7\")   Wt 82.4 kg (181 lb 9.6 oz)   SpO2 94%  Body mass index is 28.44 kg/m².   Physical Exam:  Constitutional: WDWN, NAD  Skin: Warm, dry, good turgor, no rashes in visible areas.  Psych: Alert and oriented x3, normal affect and mood.    Assessment and Plan:   The following treatment plan was discussed    1. Anxiety and depression    - escitalopram (LEXAPRO) 10 MG Tab; Take 1 tablet by mouth every day.  Dispense: 90 tablet; Refill: 3    2. Displaced fracture of base of fifth metacarpal bone, right hand, subsequent encounter for fracture with routine " healing  Letter provided      Followup: as needed

## 2021-06-02 NOTE — ASSESSMENT & PLAN NOTE
One year ago. Worker's comp. Did have splint/cast. No surgery. Did 6 weeks of PT. Still having pain. Would like letter to be able to reopen the worker's comp case.

## 2021-06-19 DIAGNOSIS — F32.A ANXIETY AND DEPRESSION: ICD-10-CM

## 2021-06-19 DIAGNOSIS — F41.9 ANXIETY AND DEPRESSION: ICD-10-CM

## 2021-06-21 RX ORDER — ESCITALOPRAM OXALATE 10 MG/1
10 TABLET ORAL DAILY
Qty: 30 TABLET | Refills: 2 | Status: SHIPPED | OUTPATIENT
Start: 2021-06-21 | End: 2022-05-02

## 2021-07-18 ENCOUNTER — OFFICE VISIT (OUTPATIENT)
Dept: URGENT CARE | Facility: PHYSICIAN GROUP | Age: 36
End: 2021-07-18
Payer: COMMERCIAL

## 2021-07-18 VITALS
HEART RATE: 90 BPM | SYSTOLIC BLOOD PRESSURE: 130 MMHG | WEIGHT: 181 LBS | BODY MASS INDEX: 28.41 KG/M2 | DIASTOLIC BLOOD PRESSURE: 78 MMHG | TEMPERATURE: 98 F | RESPIRATION RATE: 16 BRPM | OXYGEN SATURATION: 98 % | HEIGHT: 67 IN

## 2021-07-18 DIAGNOSIS — E86.0 DEHYDRATION: ICD-10-CM

## 2021-07-18 DIAGNOSIS — R11.0 NAUSEA: ICD-10-CM

## 2021-07-18 DIAGNOSIS — Z86.59 HISTORY OF ANXIETY: ICD-10-CM

## 2021-07-18 PROCEDURE — 99213 OFFICE O/P EST LOW 20 MIN: CPT | Performed by: NURSE PRACTITIONER

## 2021-07-18 RX ORDER — ONDANSETRON 4 MG/1
4 TABLET, ORALLY DISINTEGRATING ORAL EVERY 6 HOURS PRN
Qty: 10 TABLET | Refills: 0 | Status: SHIPPED | OUTPATIENT
Start: 2021-07-18 | End: 2022-10-03

## 2021-07-18 ASSESSMENT — ENCOUNTER SYMPTOMS
VOMITING: 0
DEHYDRATION: 1
NAUSEA: 1

## 2021-07-18 ASSESSMENT — FIBROSIS 4 INDEX: FIB4 SCORE: 0.4

## 2021-07-18 NOTE — PROGRESS NOTES
"Subjective:      Jax Yang is a 35 y.o. male who presents with Dehydration (fingers are numb, anxious, dry mouth, nausea, started today )            Dehydration  This is a new problem. Episode onset: pt reports he was at a bachelor party over the weekend where he drank a lot of etoh. He states he was also drinking a lot of water too. Denies any vomiting. Associated symptoms include nausea. Pertinent negatives include no vomiting. Associated symptoms comments: Endorses some watery diarrhea today. Admits he has underlying anxiety and feels extremely anxious with these current symptoms, feels \"off\". Very worried something much worse is wrong. Treatments tried: water, pedialyte and gatorade today. The treatment provided no relief.       Review of Systems   Gastrointestinal: Positive for nausea. Negative for vomiting.   Psychiatric/Behavioral: The patient is nervous/anxious.    All other systems reviewed and are negative.    Past Medical History:   Diagnosis Date   • Hypertension    • Lyme disease 2012   • Psychiatric problem    • Tobacco abuse 11/18/2014      Past Surgical History:   Procedure Laterality Date   • PB EAR CARTILAGE GRAFT TO FACE Left 3/18/2021    Procedure: AUTOGRAFT,CARTILAGE,EAR,TO NOSE OR EAR - EAR CARTILAGE, AUTOGENOUS, NOSE/EAR.;  Surgeon: Anton Bruno M.D.;  Location: SURGERY SAME DAY [facility];  Service: Ent   • TYMPANOPLASTY Left 3/18/2021    Procedure: TYMPANOPLASTY;  Surgeon: Anton Bruno M.D.;  Location: SURGERY SAME DAY [facility];  Service: Ent   • TYMPANOPLASTY Left 1/7/2020    Procedure: TYMPANOPLASTY;  Surgeon: Therese Vann M.D.;  Location: SURGERY SAME DAY [facility];  Service: Ent   • CANALOPLASTY Left 1/7/2020    Procedure: CANALOPLASTY, EAR;  Surgeon: Therese Vann M.D.;  Location: SURGERY SAME DAY [facility];  Service: Ent   • APPENDECTOMY        Social History     Socioeconomic History   • Marital status:      Spouse name: Not on file   • Number of " "children: Not on file   • Years of education: Not on file   • Highest education level: Not on file   Occupational History   • Not on file   Tobacco Use   • Smoking status: Former Smoker     Packs/day: 0.25     Years: 8.00     Pack years: 2.00     Types: Cigarettes     Quit date: 2015     Years since quittin.2   • Smokeless tobacco: Never Used   • Tobacco comment: quit Dec 2015   Vaping Use   • Vaping Use: Never used   Substance and Sexual Activity   • Alcohol use: Yes     Alcohol/week: 0.0 oz     Comment: 1 per day   • Drug use: No   • Sexual activity: Yes     Partners: Female   Other Topics Concern   • Not on file   Social History Narrative   • Not on file     Social Determinants of Health     Financial Resource Strain:    • Difficulty of Paying Living Expenses:    Food Insecurity:    • Worried About Running Out of Food in the Last Year:    • Ran Out of Food in the Last Year:    Transportation Needs:    • Lack of Transportation (Medical):    • Lack of Transportation (Non-Medical):    Physical Activity:    • Days of Exercise per Week:    • Minutes of Exercise per Session:    Stress:    • Feeling of Stress :    Social Connections:    • Frequency of Communication with Friends and Family:    • Frequency of Social Gatherings with Friends and Family:    • Attends [employer] Services:    • Active Member of Clubs or Organizations:    • Attends Club or Organization Meetings:    • Marital Status:    Intimate Partner Violence:    • Fear of Current or Ex-Partner:    • Emotionally Abused:    • Physically Abused:    • Sexually Abused:           Objective:     /78 (BP Location: Left arm, Patient Position: Sitting, BP Cuff Size: Adult)   Pulse 90   Temp 36.7 °C (98 °F) (Temporal)   Resp 16   Ht 1.702 m (5' 7\")   Wt 82.1 kg (181 lb)   SpO2 98%   BMI 28.35 kg/m²      Physical Exam  Vitals and nursing note reviewed.   Constitutional:       Appearance: Normal appearance.   HENT:      Head: Normocephalic and " atraumatic.      Nose: Nose normal.      Mouth/Throat:      Mouth: Mucous membranes are moist.      Pharynx: Oropharynx is clear.   Eyes:      Extraocular Movements: Extraocular movements intact.      Pupils: Pupils are equal, round, and reactive to light.   Cardiovascular:      Rate and Rhythm: Normal rate and regular rhythm.   Pulmonary:      Effort: Pulmonary effort is normal.   Musculoskeletal:         General: Normal range of motion.      Cervical back: Normal range of motion and neck supple.   Skin:     General: Skin is warm and dry.      Capillary Refill: Capillary refill takes less than 2 seconds.   Neurological:      General: No focal deficit present.      Mental Status: He is alert and oriented to person, place, and time. Mental status is at baseline.   Psychiatric:         Mood and Affect: Mood is anxious.         Behavior: Behavior is hyperactive.         Thought Content: Thought content normal.         Judgment: Judgment normal.                        Assessment/Plan:        1. Nausea  - ondansetron (ZOFRAN ODT) 4 MG TABLET DISPERSIBLE; Take 1 tablet by mouth every 6 hours as needed for Nausea.  Dispense: 10 tablet; Refill: 0    2. Dehydration    3. History of anxiety    Discussed with patient he is likely dehydrated after several days of consuming alcohol and being exposed to the heat and elements.   He is also very anxious because he does not feel well.  I have strongly encouraged him to continue to push pedialyte and water today. Eat small, non-greasy meals and get plenty of rest  Advised him to avoid full sugar gatorade and anymore alcohol in the near future  Encouraged him to take benadryl for his anxiety  Provided a lot of reassurance  ER precautions if he is not improving  Supportive care, differential diagnoses, and indications for immediate follow-up discussed with patient.    Pathogenesis of diagnosis discussed including typical length and natural progression.      Instructed to return to   or nearest emergency department if symptoms fail to improve, for any change in condition, further concerns, or new concerning symptoms.  Patient states understanding of the plan of care and discharge instructions.

## 2021-07-18 NOTE — LETTER
July 18, 2021    To Whom It May Concern:         This is confirmation that Jax Hany Yang attended his scheduled appointment with GEORGE Hernandez on 7/18/21.         If you have any questions please do not hesitate to call me at the phone number listed below.    Sincerely,          AUSTYN Hernandez.  [phone]

## 2021-07-18 NOTE — LETTER
July 18, 2021    To Whom It May Concern:         This is confirmation that Jax Hany Yang attended his scheduled appointment with GEORGE Hernandez on 7/18/21.    Please excuse him from work 7/19/21-7/20/21.    Sincerely,          AUSTYN Hernandez.  [phone]

## 2021-07-24 ASSESSMENT — ENCOUNTER SYMPTOMS: NERVOUS/ANXIOUS: 1

## 2021-08-02 PROBLEM — M19.041 OSTEOARTHRITIS OF RIGHT HAND: Status: ACTIVE | Noted: 2021-08-02

## 2021-09-10 RX ORDER — LISINOPRIL 10 MG/1
10 TABLET ORAL
Qty: 90 TABLET | Refills: 3 | Status: SHIPPED | OUTPATIENT
Start: 2021-09-10 | End: 2022-05-02

## 2021-09-10 NOTE — TELEPHONE ENCOUNTER
Received request via: Pharmacy    Was the patient seen in the last year in this department? Yes    Does the patient have an active prescription (recently filled or refills available) for medication(s) requested? No     Future Appointments       Provider Department [facility]    9/22/2021 7:00 AM Juan F Danielle M.D. [facility]

## 2022-04-30 DIAGNOSIS — F32.A ANXIETY AND DEPRESSION: ICD-10-CM

## 2022-04-30 DIAGNOSIS — F41.9 ANXIETY AND DEPRESSION: ICD-10-CM

## 2022-05-02 RX ORDER — ESCITALOPRAM OXALATE 10 MG/1
10 TABLET ORAL DAILY
Qty: 30 TABLET | Refills: 0 | Status: SHIPPED | OUTPATIENT
Start: 2022-05-02 | End: 2022-06-08 | Stop reason: SDUPTHER

## 2022-05-02 RX ORDER — LISINOPRIL 10 MG/1
10 TABLET ORAL
Qty: 30 TABLET | Refills: 0 | Status: SHIPPED | OUTPATIENT
Start: 2022-05-02 | End: 2022-05-04

## 2022-06-08 DIAGNOSIS — F32.A ANXIETY AND DEPRESSION: ICD-10-CM

## 2022-06-08 DIAGNOSIS — F41.9 ANXIETY AND DEPRESSION: ICD-10-CM

## 2022-06-08 RX ORDER — ESCITALOPRAM OXALATE 10 MG/1
10 TABLET ORAL DAILY
Qty: 30 TABLET | Refills: 0 | Status: SHIPPED | OUTPATIENT
Start: 2022-06-08 | End: 2022-07-09

## 2022-06-08 NOTE — TELEPHONE ENCOUNTER
Received request via: Patient    Was the patient seen in the last year in this department? Yes    Does the patient have an active prescription (recently filled or refills available) for medication(s) requested? No    Pt establishing care with new PCP on Monday but is out of lexapro. Asking for short term refill

## 2022-06-13 ENCOUNTER — APPOINTMENT (OUTPATIENT)
Dept: INTERNAL MEDICINE | Facility: OTHER | Age: 37
End: 2022-06-13
Payer: COMMERCIAL

## 2022-07-09 DIAGNOSIS — F41.9 ANXIETY AND DEPRESSION: ICD-10-CM

## 2022-07-09 DIAGNOSIS — F32.A ANXIETY AND DEPRESSION: ICD-10-CM

## 2022-07-09 NOTE — TELEPHONE ENCOUNTER
Received request via: Pharmacy    Was the patient seen in the last year in this department? No    Does the patient have an active prescription (recently filled or refills available) for medication(s) requested? No     Future Appointments       Provider Department [place]    10/3/2022 8:40 AM (Arrive by 8:25 AM) [facility]    11/3/2022 8:40 AM (Arrive by 8:25 AM) GEOR[facility]

## 2022-07-11 RX ORDER — ESCITALOPRAM OXALATE 10 MG/1
10 TABLET ORAL DAILY
Qty: 30 TABLET | Refills: 0 | Status: SHIPPED | OUTPATIENT
Start: 2022-07-11 | End: 2022-08-22 | Stop reason: SDUPTHER

## 2022-08-22 DIAGNOSIS — F32.A ANXIETY AND DEPRESSION: ICD-10-CM

## 2022-08-22 DIAGNOSIS — F41.9 ANXIETY AND DEPRESSION: ICD-10-CM

## 2022-08-22 NOTE — TELEPHONE ENCOUNTER
Received request via: Pharmacy    Was the patient seen in the last year in this department? No    Does the patient have an active prescription (recently filled or refills available) for medication(s) requested? No    Future Appointments         Provider Department [place]    10/3/2022 8:40 AM (Arrive by 8:25 AM) GEORGE [facility]    11/3/2022 8:40 AM (Arrive by 8:25 AM) GOERGE Wilson [facility]

## 2022-08-23 RX ORDER — ESCITALOPRAM OXALATE 10 MG/1
10 TABLET ORAL DAILY
Qty: 90 TABLET | Refills: 0 | Status: SHIPPED | OUTPATIENT
Start: 2022-08-23 | End: 2022-10-03

## 2022-10-03 ENCOUNTER — OFFICE VISIT (OUTPATIENT)
Dept: MEDICAL GROUP | Facility: PHYSICIAN GROUP | Age: 37
End: 2022-10-03
Payer: COMMERCIAL

## 2022-10-03 VITALS
HEIGHT: 67 IN | HEART RATE: 89 BPM | WEIGHT: 200 LBS | TEMPERATURE: 97.2 F | SYSTOLIC BLOOD PRESSURE: 132 MMHG | RESPIRATION RATE: 20 BRPM | OXYGEN SATURATION: 95 % | BODY MASS INDEX: 31.39 KG/M2 | DIASTOLIC BLOOD PRESSURE: 92 MMHG

## 2022-10-03 DIAGNOSIS — I10 ESSENTIAL HYPERTENSION: ICD-10-CM

## 2022-10-03 DIAGNOSIS — F41.9 ANXIETY AND DEPRESSION: ICD-10-CM

## 2022-10-03 DIAGNOSIS — F32.A ANXIETY AND DEPRESSION: ICD-10-CM

## 2022-10-03 DIAGNOSIS — Z80.0 FAMILY HISTORY OF COLON CANCER: ICD-10-CM

## 2022-10-03 PROBLEM — S62.316D: Status: RESOLVED | Noted: 2020-07-01 | Resolved: 2022-10-03

## 2022-10-03 PROBLEM — R00.2 PALPITATIONS: Status: RESOLVED | Noted: 2020-07-30 | Resolved: 2022-10-03

## 2022-10-03 PROCEDURE — 99214 OFFICE O/P EST MOD 30 MIN: CPT | Performed by: NURSE PRACTITIONER

## 2022-10-03 RX ORDER — METHOCARBAMOL 500 MG/1
TABLET, FILM COATED ORAL
COMMUNITY
Start: 2022-09-13 | End: 2022-11-08

## 2022-10-03 RX ORDER — GABAPENTIN 300 MG/1
300 CAPSULE ORAL 2 TIMES DAILY
COMMUNITY
Start: 2022-09-04 | End: 2022-11-08

## 2022-10-03 RX ORDER — ESCITALOPRAM OXALATE 5 MG/1
5 TABLET ORAL DAILY
Qty: 30 TABLET | Refills: 0 | Status: SHIPPED | OUTPATIENT
Start: 2022-10-03 | End: 2022-10-03

## 2022-10-03 RX ORDER — PREDNISONE 10 MG/1
TABLET ORAL
COMMUNITY
Start: 2022-09-13 | End: 2022-10-03

## 2022-10-03 RX ORDER — LISINOPRIL 10 MG/1
10 TABLET ORAL
Qty: 90 TABLET | Refills: 3 | Status: SHIPPED | OUTPATIENT
Start: 2022-10-03 | End: 2022-11-19 | Stop reason: SDUPTHER

## 2022-10-03 RX ORDER — ESCITALOPRAM OXALATE 5 MG/1
5 TABLET ORAL DAILY
Qty: 90 TABLET | Refills: 1 | Status: SHIPPED | OUTPATIENT
Start: 2022-10-03 | End: 2022-12-27

## 2022-10-03 ASSESSMENT — PATIENT HEALTH QUESTIONNAIRE - PHQ9: CLINICAL INTERPRETATION OF PHQ2 SCORE: 0

## 2022-10-03 NOTE — ASSESSMENT & PLAN NOTE
This is a chronic stable condition.  Patient has been stable on Lexapro 10 mg for several years.  He does report that he has recently changed some stuff within his life and has developed coping mechanisms to help with his anxiety and depression.  He would like to taper off the Lexapro after he is done tapering off gabapentin.  Discussed with patient how he should taper medication.    Will provide patient refills of his Lexapro of the 5 mg tablets to allow him to taper down.

## 2022-10-03 NOTE — PROGRESS NOTES
"  CC: establish care                                                                                                                                      HPI:   Jax presents today with the following.    Problem   Anxiety and Depression   Family History of Colon Cancer   Essential Hypertension   Palpitations (Resolved)   Displaced Fracture of Base of Fifth Metacarpal Bone, Right Hand, Subsequent Encounter for Fracture With Routine Healing (Resolved)   Insomnia (Resolved)       Current Outpatient Medications   Medication Sig Dispense Refill    gabapentin (NEURONTIN) 300 MG Cap Take 300 mg by mouth 2 times a day.      escitalopram (LEXAPRO) 5 MG tablet Take 1 Tablet by mouth every day. 90 Tablet 1    lisinopril (PRINIVIL) 10 MG Tab Take 1 Tablet by mouth every day. 90 Tablet 3    methocarbamol (ROBAXIN) 500 MG Tab TAKE 1 TABLET BY MOUTH THREE TIMES DAILY AS NEEDED FOR SPASMS (Patient not taking: Reported on 10/3/2022)       No current facility-administered medications for this visit.       Allergies as of 10/03/2022    (No Known Allergies)        ROS:  All systems negative expect as addressed in assessment and plan.     BP (!) 132/92 (BP Location: Left arm, Patient Position: Sitting, BP Cuff Size: Adult)   Pulse 89   Temp 36.2 °C (97.2 °F) (Temporal)   Resp 20   Ht 1.702 m (5' 7\")   Wt 90.7 kg (200 lb)   SpO2 95%   BMI 31.32 kg/m²     Physical Exam:  Gen:         Alert and oriented, No apparent distress.  Neck:        No Lymphadenopathy.   Lungs:     Clear to auscultation bilaterally. No wheezes, rales, or rhonchi.   CV:          Regular rate and rhythm. No murmurs, rubs or gallops.         Ext:          No clubbing, cyanosis, or peripheral edema.  Skin:  All visible skin intact without lesions.       Assessment and Plan:  37 y.o. male with the following issues.    1. Essential hypertension        2. Family history of colon cancer  Referral to Genetic Research Studies      3. Anxiety and depression  " escitalopram (LEXAPRO) 5 MG tablet    DISCONTINUED: escitalopram (LEXAPRO) 5 MG tablet           Essential hypertension  This is a chronic stable condition. Patient is stable on lisinopril.     Continue medication.     Family history of colon cancer  Patient had a colonoscopy last year due to family history. He reports that no polyps or issues were found.     Will continue screening every 5 years.     Anxiety and depression  This is a chronic stable condition.  Patient has been stable on Lexapro 10 mg for several years.  He does report that he has recently changed some stuff within his life and has developed coping mechanisms to help with his anxiety and depression.  He would like to taper off the Lexapro after he is done tapering off gabapentin.  Discussed with patient how he should taper medication.    Will provide patient refills of his Lexapro of the 5 mg tablets to allow him to taper down.      Return for as needed or yearly.    I have placed the below orders and discussed them with an approved delegating provider.  The MA is performing the below orders under the direction of Dr. Quinn.    Please note that this dictation was created using voice recognition software. I have worked with consultants from the vendor as well as technical experts from [facility] [employer] to optimize the interface. I have made every reasonable attempt to correct obvious errors, but I expect that there are errors of grammar and possibly content that I did not discover before finalizing the note.

## 2022-10-14 ENCOUNTER — RESEARCH ENCOUNTER (OUTPATIENT)
Dept: VASCULAR LAB | Facility: MEDICAL CENTER | Age: 37
End: 2022-10-14
Attending: NURSE PRACTITIONER
Payer: COMMERCIAL

## 2022-10-14 DIAGNOSIS — Z00.6 RESEARCH STUDY PATIENT: ICD-10-CM

## 2022-11-08 ENCOUNTER — APPOINTMENT (OUTPATIENT)
Dept: URGENT CARE | Facility: PHYSICIAN GROUP | Age: 37
End: 2022-11-08
Payer: COMMERCIAL

## 2022-11-08 ENCOUNTER — TELEMEDICINE (OUTPATIENT)
Dept: MEDICAL GROUP | Facility: PHYSICIAN GROUP | Age: 37
End: 2022-11-08
Payer: COMMERCIAL

## 2022-11-08 VITALS — BODY MASS INDEX: 31.39 KG/M2 | HEIGHT: 67 IN | WEIGHT: 200 LBS

## 2022-11-08 DIAGNOSIS — R05.2 SUBACUTE COUGH: ICD-10-CM

## 2022-11-08 PROCEDURE — 99213 OFFICE O/P EST LOW 20 MIN: CPT | Mod: 95 | Performed by: NURSE PRACTITIONER

## 2022-11-08 RX ORDER — GABAPENTIN 100 MG/1
200 CAPSULE ORAL 2 TIMES DAILY
COMMUNITY
Start: 2022-09-20 | End: 2022-11-08

## 2022-11-08 RX ORDER — ALBUTEROL SULFATE 90 UG/1
2 AEROSOL, METERED RESPIRATORY (INHALATION) EVERY 4 HOURS PRN
Qty: 1 EACH | Refills: 5 | Status: SHIPPED | OUTPATIENT
Start: 2022-11-08 | End: 2024-01-24

## 2022-11-08 RX ORDER — BENZONATATE 100 MG/1
100 CAPSULE ORAL 3 TIMES DAILY PRN
Qty: 60 CAPSULE | Refills: 0 | Status: SHIPPED | OUTPATIENT
Start: 2022-11-08 | End: 2023-09-18

## 2022-11-08 RX ORDER — PREDNISONE 20 MG/1
40 TABLET ORAL DAILY
Qty: 10 TABLET | Refills: 0 | Status: SHIPPED | OUTPATIENT
Start: 2022-11-08 | End: 2022-11-13

## 2022-11-09 ENCOUNTER — HOSPITAL ENCOUNTER (OUTPATIENT)
Dept: RADIOLOGY | Facility: MEDICAL CENTER | Age: 37
End: 2022-11-09
Attending: NURSE PRACTITIONER
Payer: COMMERCIAL

## 2022-11-09 DIAGNOSIS — R05.2 SUBACUTE COUGH: ICD-10-CM

## 2022-11-09 PROCEDURE — 71046 X-RAY EXAM CHEST 2 VIEWS: CPT

## 2022-11-09 NOTE — ASSESSMENT & PLAN NOTE
"This is an acute on chronic condition. Patient reports that he has had a cough for about 4 weeks. He also states in the last few days he has a \"burning\" feeling in his lungs when he takes a deep breath. He also reports occassional wheezing with the cough. He reports that he has a deep productive cough with clear thick mucus.     He denies fever, rhinitis, post nasal drip, or any sick contacts. He denies any heartburn or acid reflux. He denies any recent unintended weight loss.     He has taken mucinex, cough drops, and dayquil to help with the cough. He states that the mucinex helped his mucus.     Discussed that cough is most likely viral bronchitis. However, due to the SOB/wheezing will provide patient with albuterol inhaler and burst of steroids. I will also send in tessalon perles to help with the cough. Chest xray ordered to evaluate lungs due to length of symptoms.     Patient to follow up if cough does not improve with steroids and/or his symptoms change.             "

## 2022-11-09 NOTE — PROGRESS NOTES
Virtual Visit: Established Patient   This visit was conducted via Zoom using secure and encrypted videoconferencing technology. The patient was in a private location in the state of [place].    The patient's identity was confirmed and verbal consent was obtained for this virtual visit.    Subjective:   CC: cough 3-4 weeks    Jax Yang is a 37 y.o. male presenting for evaluation and management of:    Problem   Subacute Cough       ROS   Denies any recent fevers or chills. No nausea or vomiting. No chest pains or shortness of breath.     No Known Allergies    Current medicines (including changes today)  Current Outpatient Medications   Medication Sig Dispense Refill    predniSONE (DELTASONE) 20 MG Tab Take 2 Tablets by mouth every day for 5 days. 10 Tablet 0    benzonatate (TESSALON) 100 MG Cap Take 1 Capsule by mouth 3 times a day as needed for Cough. 60 Capsule 0    albuterol 108 (90 Base) MCG/ACT Aero Soln inhalation aerosol Inhale 2 Puffs every four hours as needed for Shortness of Breath. 1 Each 5    escitalopram (LEXAPRO) 5 MG tablet Take 1 Tablet by mouth every day. 90 Tablet 1    lisinopril (PRINIVIL) 10 MG Tab Take 1 Tablet by mouth every day. 90 Tablet 3     No current facility-administered medications for this visit.       Patient Active Problem List    Diagnosis Date Noted    Subacute cough 11/08/2022    Osteoarthritis of right hand 08/02/2021    Anxiety and depression 02/18/2021    10 year risk of MI or stroke low; light time risk ascvd >40% 08/27/2020    Chronic fatigue 07/30/2020    Family history of colon cancer 09/11/2019    Vitamin D deficiency 06/01/2016    Essential hypertension 08/06/2015         He  has a past medical history of Displaced fracture of base of fifth metacarpal bone, right hand, subsequent encounter for fracture with routine healing (7/1/2020), Hypertension, Insomnia (4/19/2016), Lyme disease (2012), Palpitations (7/30/2020), Psychiatric problem, and Tobacco abuse  "(11/18/2014).  He  has a past surgical history that includes appendectomy; tympanoplasty (Left, 1/7/2020); canaloplasty (Left, 1/7/2020); pr ear cartilage graft to face (Left, 3/18/2021); and tympanoplasty (Left, 3/18/2021).       Objective:   Ht 1.702 m (5' 7\")   Wt 90.7 kg (200 lb)   BMI 31.32 kg/m²     Physical Exam:  Constitutional: Alert, no distress, well-groomed.  Skin: No rashes in visible areas.  Eye: Round. Conjunctiva clear, lids normal. No icterus.   ENMT: Lips pink without lesions, good dentition, moist mucous membranes. Phonation normal.  Neck: Moves freely without pain.  Respiratory: Unlabored respiratory effort, no cough or audible wheeze  Psych: Alert and oriented x3, normal affect and mood.       Assessment and Plan:   The following treatment plan was discussed:     1. Subacute cough  DX-CHEST-2 VIEWS          Subacute cough  This is an acute on chronic condition. Patient reports that he has had a cough for about 4 weeks. He also states in the last few days he has a \"burning\" feeling in his lungs when he takes a deep breath. He also reports occassional wheezing with the cough. He reports that he has a deep productive cough with clear thick mucus.     He denies fever, rhinitis, post nasal drip, or any sick contacts. He denies any heartburn or acid reflux. He denies any recent unintended weight loss.     He has taken mucinex, cough drops, and dayquil to help with the cough. He states that the mucinex helped his mucus.     Discussed that cough is most likely viral bronchitis. However, due to the SOB/wheezing will provide patient with albuterol inhaler and burst of steroids. I will also send in tessalon perles to help with the cough. Chest xray ordered to evaluate lungs due to length of symptoms.     Patient to follow up if cough does not improve with steroids and/or his symptoms change.             Follow-up: Return if symptoms worsen or fail to improve.      I have placed the below orders and " discussed them with an approved delegating provider.  The MA is performing the below orders under the direction of Dr. Love.    Please note that this dictation was created using voice recognition software. I have worked with consultants from the vendor as well as technical experts from [facility] to optimize the interface. I have made every reasonable attempt to correct obvious errors, but I expect that there are errors of grammar and possibly content that I did not discover before finalizing the note.

## 2022-11-19 ENCOUNTER — TELEPHONE (OUTPATIENT)
Dept: MEDICAL GROUP | Facility: MEDICAL CENTER | Age: 37
End: 2022-11-19
Payer: COMMERCIAL

## 2022-11-19 DIAGNOSIS — F32.A ANXIETY AND DEPRESSION: ICD-10-CM

## 2022-11-19 DIAGNOSIS — F41.9 ANXIETY AND DEPRESSION: ICD-10-CM

## 2022-11-19 RX ORDER — LISINOPRIL 10 MG/1
10 TABLET ORAL
Qty: 7 TABLET | Refills: 0 | Status: SHIPPED | OUTPATIENT
Start: 2022-11-19 | End: 2023-12-22 | Stop reason: SDUPTHER

## 2022-11-19 RX ORDER — ESCITALOPRAM OXALATE 10 MG/1
10 TABLET ORAL DAILY
Qty: 7 TABLET | Refills: 0 | Status: SHIPPED | OUTPATIENT
Start: 2022-11-19 | End: 2022-12-27 | Stop reason: SDUPTHER

## 2022-11-19 NOTE — TELEPHONE ENCOUNTER
On-Call Encounter:    Patient called service as he is traveling and his prescriptions were misplaced.     -sent 7 day supply of Lisinopril and Lexapro to [facility] in [place], [place].

## 2022-11-28 LAB
APOB+LDLR+PCSK9 GENE MUT ANL BLD/T: NOT DETECTED
BRCA1+BRCA2 DEL+DUP + FULL MUT ANL BLD/T: NOT DETECTED
MLH1+MSH2+MSH6+PMS2 GN DEL+DUP+FUL M: NOT DETECTED

## 2022-12-24 ENCOUNTER — PATIENT MESSAGE (OUTPATIENT)
Dept: MEDICAL GROUP | Facility: PHYSICIAN GROUP | Age: 37
End: 2022-12-24
Payer: COMMERCIAL

## 2022-12-27 RX ORDER — ESCITALOPRAM OXALATE 10 MG/1
10 TABLET ORAL DAILY
Qty: 90 TABLET | Refills: 3 | Status: SHIPPED | OUTPATIENT
Start: 2022-12-27 | End: 2023-12-22 | Stop reason: SDUPTHER

## 2023-09-18 ENCOUNTER — OFFICE VISIT (OUTPATIENT)
Dept: URGENT CARE | Facility: PHYSICIAN GROUP | Age: 38
End: 2023-09-18
Payer: COMMERCIAL

## 2023-09-18 VITALS
BODY MASS INDEX: 27.47 KG/M2 | DIASTOLIC BLOOD PRESSURE: 80 MMHG | WEIGHT: 175 LBS | SYSTOLIC BLOOD PRESSURE: 118 MMHG | HEART RATE: 113 BPM | HEIGHT: 67 IN | TEMPERATURE: 98.6 F | OXYGEN SATURATION: 97 % | RESPIRATION RATE: 18 BRPM

## 2023-09-18 DIAGNOSIS — H57.11 EYE PAIN, RIGHT: ICD-10-CM

## 2023-09-18 DIAGNOSIS — H05.229 ORBITAL SWELLING: ICD-10-CM

## 2023-09-18 PROCEDURE — 3079F DIAST BP 80-89 MM HG: CPT | Performed by: PHYSICIAN ASSISTANT

## 2023-09-18 PROCEDURE — 99213 OFFICE O/P EST LOW 20 MIN: CPT | Performed by: PHYSICIAN ASSISTANT

## 2023-09-18 PROCEDURE — 3074F SYST BP LT 130 MM HG: CPT | Performed by: PHYSICIAN ASSISTANT

## 2023-09-18 RX ORDER — POLYVINYL ALCOHOL 14 MG/ML
1 SOLUTION/ DROPS OPHTHALMIC PRN
Qty: 30 ML | Refills: 3 | Status: SHIPPED | OUTPATIENT
Start: 2023-09-18 | End: 2024-01-24

## 2023-09-18 RX ORDER — BUPROPION HYDROCHLORIDE 100 MG/1
100 TABLET ORAL
COMMUNITY
End: 2024-01-24

## 2023-09-18 RX ORDER — MOXIFLOXACIN 5 MG/ML
1 SOLUTION/ DROPS OPHTHALMIC 3 TIMES DAILY
Qty: 3 ML | Refills: 0 | Status: SHIPPED | OUTPATIENT
Start: 2023-09-18 | End: 2023-09-25

## 2023-09-18 RX ORDER — LISINOPRIL 10 MG/1
TABLET ORAL DAILY
COMMUNITY
End: 2023-09-18

## 2023-09-18 ASSESSMENT — ENCOUNTER SYMPTOMS
CONSTITUTIONAL NEGATIVE: 1
EYE REDNESS: 0
EYE PAIN: 1
EYE DISCHARGE: 0
BLURRED VISION: 0

## 2023-09-18 NOTE — PROGRESS NOTES
"  Subjective:     Jax Yang  is a 37 y.o. male who presents for Other (Possible right eye infection x1 week )       He presents today with right-sided eye pain that has been ongoing over the past week but has become abruptly worse within the last few hours.  He states that the pain went from a 2/10 to a 8-9/10.  Is having some blurriness in vision.  States that there has been a large marble sized mass form on the lateral aspect of his eye developed over the last several hours.  Denies contact lens use.  No trauma or injury to eye associated with symptom onset.  Denies any chemicals or other foreign substances within the eye.       Review of Systems   Constitutional: Negative.    Eyes:  Positive for pain. Negative for blurred vision, discharge and redness.        Localized swelling on the surface of the eye      No Known Allergies  Past Medical History:   Diagnosis Date    Displaced fracture of base of fifth metacarpal bone, right hand, subsequent encounter for fracture with routine healing 7/1/2020    Hypertension     Insomnia 4/19/2016    Lyme disease 2012    Palpitations 7/30/2020    Psychiatric problem     Tobacco abuse 11/18/2014        Objective:   /80 (BP Location: Right arm, Patient Position: Sitting, BP Cuff Size: Adult)   Pulse (!) 113   Temp 37 °C (98.6 °F) (Temporal)   Resp 18   Ht 1.702 m (5' 7\")   Wt 79.4 kg (175 lb)   SpO2 97%   BMI 27.41 kg/m²   Physical Exam  Vitals and nursing note reviewed.   Constitutional:       General: He is not in acute distress.     Appearance: He is not ill-appearing or toxic-appearing.   HENT:      Head: Normocephalic.      Nose: No rhinorrhea.   Eyes:      General: No scleral icterus.        Right eye: No discharge.         Left eye: No discharge.      Extraocular Movements: Extraocular movements intact.      Pupils: Pupils are equal, round, and reactive to light.        Comments: Examination of the right eye it does reveal localized cystlike " structure present over the above marked region of the eye.  Eye is tender to palpation and painful to the patient.   Pulmonary:      Effort: Pulmonary effort is normal. No respiratory distress.      Breath sounds: No stridor.   Musculoskeletal:      Cervical back: Neck supple.   Neurological:      Mental Status: He is alert and oriented to person, place, and time.   Psychiatric:         Mood and Affect: Mood normal.         Behavior: Behavior normal.         Thought Content: Thought content normal.         Judgment: Judgment normal.             Diagnostic testing: None    Assessment/Plan:     Encounter Diagnoses   Name Primary?    Eye pain, right     Orbital swelling           Plan for care for today's complaint includes starting the patient on moxifloxacin eyedrops and artificial tears with stat urgent referral placed ophthalmology for further evaluation of his right eye pain.  Discussed with the patient his option to be transferred to the emergency department at this time for further evaluation and management; however, patient did elect to trial outpatient management.  He is established with [facility] and did contact them during today's office visit but they were unable to get him in for an appointment.  ER precautions were discussed..  Prescription for moxifloxacin eyedrops, artificial tears provided.    See AVS Instructions below for written guidance provided to patient on after-visit management and care in addition to our verbal discussion during the visit.    Please note that this dictation was created using voice recognition software. I have attempted to correct all errors, but there may be sound-alike, spelling, grammar and possibly content errors that I did not discover before finalizing the note.    Shalom Muhammad PA-C

## 2023-12-21 ENCOUNTER — PATIENT MESSAGE (OUTPATIENT)
Dept: MEDICAL GROUP | Facility: PHYSICIAN GROUP | Age: 38
End: 2023-12-21
Payer: COMMERCIAL

## 2023-12-26 RX ORDER — LISINOPRIL 10 MG/1
10 TABLET ORAL
Qty: 30 TABLET | Refills: 0 | Status: SHIPPED | OUTPATIENT
Start: 2023-12-26 | End: 2024-01-24 | Stop reason: SDUPTHER

## 2023-12-26 RX ORDER — ESCITALOPRAM OXALATE 10 MG/1
10 TABLET ORAL DAILY
Qty: 30 TABLET | Refills: 0 | Status: SHIPPED | OUTPATIENT
Start: 2023-12-26 | End: 2024-01-24 | Stop reason: SDUPTHER

## 2024-01-24 ENCOUNTER — OFFICE VISIT (OUTPATIENT)
Dept: MEDICAL GROUP | Facility: PHYSICIAN GROUP | Age: 39
End: 2024-01-24
Payer: COMMERCIAL

## 2024-01-24 VITALS
HEIGHT: 67 IN | TEMPERATURE: 97.7 F | DIASTOLIC BLOOD PRESSURE: 82 MMHG | WEIGHT: 207 LBS | RESPIRATION RATE: 20 BRPM | SYSTOLIC BLOOD PRESSURE: 122 MMHG | OXYGEN SATURATION: 95 % | BODY MASS INDEX: 32.49 KG/M2 | HEART RATE: 90 BPM

## 2024-01-24 DIAGNOSIS — F41.9 ANXIETY: ICD-10-CM

## 2024-01-24 DIAGNOSIS — E66.9 OBESITY (BMI 30-39.9): ICD-10-CM

## 2024-01-24 DIAGNOSIS — F33.42 RECURRENT MAJOR DEPRESSIVE DISORDER, IN FULL REMISSION (HCC): ICD-10-CM

## 2024-01-24 DIAGNOSIS — F32.A ANXIETY AND DEPRESSION: ICD-10-CM

## 2024-01-24 DIAGNOSIS — R79.89 LOW TESTOSTERONE IN MALE: ICD-10-CM

## 2024-01-24 DIAGNOSIS — F41.9 ANXIETY AND DEPRESSION: ICD-10-CM

## 2024-01-24 DIAGNOSIS — I10 ESSENTIAL HYPERTENSION: ICD-10-CM

## 2024-01-24 DIAGNOSIS — Z23 NEED FOR VACCINATION: ICD-10-CM

## 2024-01-24 PROBLEM — R05.2 SUBACUTE COUGH: Status: RESOLVED | Noted: 2022-11-08 | Resolved: 2024-01-24

## 2024-01-24 PROBLEM — R53.82 CHRONIC FATIGUE: Status: RESOLVED | Noted: 2020-07-30 | Resolved: 2024-01-24

## 2024-01-24 PROCEDURE — 90686 IIV4 VACC NO PRSV 0.5 ML IM: CPT

## 2024-01-24 PROCEDURE — 3079F DIAST BP 80-89 MM HG: CPT

## 2024-01-24 PROCEDURE — 3074F SYST BP LT 130 MM HG: CPT

## 2024-01-24 PROCEDURE — 90471 IMMUNIZATION ADMIN: CPT

## 2024-01-24 PROCEDURE — 99395 PREV VISIT EST AGE 18-39: CPT | Mod: 25

## 2024-01-24 RX ORDER — CHLORAL HYDRATE 500 MG
1000 CAPSULE ORAL
COMMUNITY

## 2024-01-24 RX ORDER — TESTOSTERONE CYPIONATE 200 MG/ML
INJECTION, SOLUTION INTRAMUSCULAR
COMMUNITY
Start: 2024-01-11

## 2024-01-24 RX ORDER — CHOLECALCIFEROL (VITAMIN D3) 125 MCG
500 CAPSULE ORAL DAILY
COMMUNITY

## 2024-01-24 RX ORDER — VITAMIN B COMPLEX
1000 TABLET ORAL DAILY
COMMUNITY

## 2024-01-24 RX ORDER — LISINOPRIL 10 MG/1
10 TABLET ORAL
Qty: 90 TABLET | Refills: 3 | Status: SHIPPED | OUTPATIENT
Start: 2024-01-24

## 2024-01-24 RX ORDER — ESCITALOPRAM OXALATE 10 MG/1
10 TABLET ORAL DAILY
Qty: 90 TABLET | Refills: 3 | Status: SHIPPED | OUTPATIENT
Start: 2024-01-24

## 2024-01-24 ASSESSMENT — PATIENT HEALTH QUESTIONNAIRE - PHQ9
6. FEELING BAD ABOUT YOURSELF - OR THAT YOU ARE A FAILURE OR HAVE LET YOURSELF OR YOUR FAMILY DOWN: NOT AL ALL
9. THOUGHTS THAT YOU WOULD BE BETTER OFF DEAD, OR OF HURTING YOURSELF: NOT AT ALL
SUM OF ALL RESPONSES TO PHQ QUESTIONS 1-9: 1
8. MOVING OR SPEAKING SO SLOWLY THAT OTHER PEOPLE COULD HAVE NOTICED. OR THE OPPOSITE, BEING SO FIGETY OR RESTLESS THAT YOU HAVE BEEN MOVING AROUND A LOT MORE THAN USUAL: NOT AT ALL
2. FEELING DOWN, DEPRESSED, IRRITABLE, OR HOPELESS: NOT AT ALL
3. TROUBLE FALLING OR STAYING ASLEEP OR SLEEPING TOO MUCH: SEVERAL DAYS
7. TROUBLE CONCENTRATING ON THINGS, SUCH AS READING THE NEWSPAPER OR WATCHING TELEVISION: NOT AT ALL
4. FEELING TIRED OR HAVING LITTLE ENERGY: NOT AT ALL
CLINICAL INTERPRETATION OF PHQ2 SCORE: 0
5. POOR APPETITE OR OVEREATING: NOT AT ALL
SUM OF ALL RESPONSES TO PHQ9 QUESTIONS 1 AND 2: 0
1. LITTLE INTEREST OR PLEASURE IN DOING THINGS: NOT AT ALL

## 2024-01-24 NOTE — PROGRESS NOTES
"Subjective:     CC: Diagnoses of Need for vaccination, Essential hypertension, Anxiety and depression, Low testosterone in male, Recurrent major depressive disorder, in full remission (HCC), Anxiety, and Obesity (BMI 30-39.9) were pertinent to this visit.    Pt of Laura Guardado presents today feeling well, requesting refills of his medications.    Reports routine labs completed with employer, will provide results.    HPI:   Jax presents today with    Problem   Low Testosterone in Male   Anxiety   Obesity (Bmi 30-39.9)   Recurrent Major Depressive Disorder, in Full Remission (Hcc)   Subacute Cough (Resolved)   Chronic Fatigue (Resolved)       Health Maintenance: Completed  Cancer screening:   Colorectal cancer screening: completed 2023/ family history    Infectious disease screening/Immunizaitons  -STI screening: declines  Practices safe sex.  -HIV Screening: reports annually with work  -Immunizaitons:   Influenza: 1/24/24  Tetanus: up-to-date: 9/9/29 due  Anticipatory Guidance:  Elicits he is eating a variety of fresh veggies/fruits, variety of meats,   limited fast food/junk food  Soda: no  Exercise: recommended 150 minutes/week: jujitsu/resistance training.  Substance Abuse: n/a  Safe in relationship. yes  Seat belts, bike helmet, gun safety discussed.  Sun protection used.    ROS:  Review of Systems   All other systems reviewed and are negative.      Objective:     Exam:  /82 (BP Location: Left arm, Patient Position: Sitting, BP Cuff Size: Adult)   Pulse 90   Temp 36.5 °C (97.7 °F) (Temporal)   Resp 20   Ht 1.702 m (5' 7\")   Wt 93.9 kg (207 lb)   SpO2 95%   BMI 32.42 kg/m²  Body mass index is 32.42 kg/m².    Physical Exam  Vitals reviewed.   Constitutional:       General: He is not in acute distress.     Appearance: Normal appearance. He is obese. He is not ill-appearing.   HENT:      Head: Normocephalic and atraumatic.      Right Ear: Tympanic membrane, ear canal and external ear normal.      " Left Ear: Ear canal and external ear normal. Tympanic membrane is scarred.      Nose: Nose normal.      Mouth/Throat:      Mouth: Mucous membranes are moist.      Pharynx: Oropharynx is clear.   Eyes:      Extraocular Movements: Extraocular movements intact.      Conjunctiva/sclera: Conjunctivae normal.      Pupils: Pupils are equal, round, and reactive to light.   Neck:      Thyroid: No thyromegaly.      Trachea: Trachea normal.   Cardiovascular:      Rate and Rhythm: Normal rate and regular rhythm.      Pulses: Normal pulses.      Heart sounds: Normal heart sounds. No murmur heard.  Pulmonary:      Effort: Pulmonary effort is normal. No respiratory distress.      Breath sounds: Normal breath sounds. No wheezing.   Abdominal:      General: Bowel sounds are normal.      Palpations: Abdomen is soft.   Musculoskeletal:         General: No swelling, tenderness or deformity. Normal range of motion.      Cervical back: Full passive range of motion without pain.   Lymphadenopathy:      Cervical: No cervical adenopathy.   Skin:     General: Skin is warm and dry.      Capillary Refill: Capillary refill takes less than 2 seconds.   Neurological:      General: No focal deficit present.      Mental Status: He is alert and oriented to person, place, and time.      Cranial Nerves: No cranial nerve deficit.      Sensory: No sensory deficit.      Motor: No weakness.   Psychiatric:         Mood and Affect: Mood normal.         Behavior: Behavior normal.       Assessment & Plan:     38 y.o. male with the following -     Problem List Items Addressed This Visit       Essential hypertension    Relevant Medications    lisinopril (PRINIVIL) 10 MG Tab    Recurrent major depressive disorder, in full remission (HCC)     Chronic, stable. Continue lexapro 10 mg daily. Seeing therapy for CBT every 2 weeks.         Relevant Medications    escitalopram (LEXAPRO) 10 MG Tab    Low testosterone in male     Chronic, stable. Managed by [facility], using 0.6  weekly injections, with blood draw every 6-8 weeks.  Noticing improved symptoms with this- fatigue, energy, sleep, mental focus         Anxiety     Chronic, stable. Continue lexapro 10 mg daily.         Relevant Medications    escitalopram (LEXAPRO) 10 MG Tab    Obesity (BMI 30-39.9)    Relevant Orders    Patient identified as having weight management issue.  Appropriate orders and counseling given.     Other Visit Diagnoses       Need for vaccination        Relevant Orders    INFLUENZA VACCINE QUAD INJ (PF) (Completed)          Patient was educated in proper administration of medication(s) ordered today including safety, possible SE, risks, benefits, rationale and alternatives to therapy.   Supportive care, differential diagnoses, and indications for immediate follow-up discussed with patient.    Pathogenesis of diagnosis discussed including typical length and natural progression.    Instructed to return to clinic or nearest emergency department for any change in condition, further concerns, or worsening of symptoms.  Patient states understanding of the plan of care and discharge instructions.    Return in about 1 year (around 1/24/2025), or if symptoms worsen or fail to improve.    Please note that this dictation was created using voice recognition software. I have made every reasonable attempt to correct obvious errors, but I expect that there are errors of grammar and possibly content that I did not discover before finalizing the note.

## 2024-01-24 NOTE — ASSESSMENT & PLAN NOTE
Chronic, stable. Managed by [facility], using 0.6 weekly injections, with blood draw every 6-8 weeks.  Noticing improved symptoms with this- fatigue, energy, sleep, mental focus

## 2024-03-07 ENCOUNTER — OFFICE VISIT (OUTPATIENT)
Dept: URGENT CARE | Facility: PHYSICIAN GROUP | Age: 39
End: 2024-03-07
Payer: COMMERCIAL

## 2024-03-07 VITALS
DIASTOLIC BLOOD PRESSURE: 78 MMHG | BODY MASS INDEX: 32.49 KG/M2 | SYSTOLIC BLOOD PRESSURE: 110 MMHG | RESPIRATION RATE: 16 BRPM | OXYGEN SATURATION: 100 % | TEMPERATURE: 97.9 F | HEIGHT: 67 IN | WEIGHT: 207 LBS | HEART RATE: 89 BPM

## 2024-03-07 DIAGNOSIS — R25.2 SPASM: ICD-10-CM

## 2024-03-07 DIAGNOSIS — S39.012A STRAIN OF LUMBAR REGION, INITIAL ENCOUNTER: ICD-10-CM

## 2024-03-07 PROCEDURE — 3074F SYST BP LT 130 MM HG: CPT | Performed by: FAMILY MEDICINE

## 2024-03-07 PROCEDURE — 3078F DIAST BP <80 MM HG: CPT | Performed by: FAMILY MEDICINE

## 2024-03-07 PROCEDURE — 1125F AMNT PAIN NOTED PAIN PRSNT: CPT | Performed by: FAMILY MEDICINE

## 2024-03-07 PROCEDURE — 99213 OFFICE O/P EST LOW 20 MIN: CPT | Performed by: FAMILY MEDICINE

## 2024-03-07 RX ORDER — METHOCARBAMOL 500 MG/1
500 TABLET, FILM COATED ORAL 3 TIMES DAILY PRN
Qty: 30 TABLET | Refills: 0 | Status: SHIPPED | OUTPATIENT
Start: 2024-03-07 | End: 2024-04-06

## 2024-03-07 ASSESSMENT — ENCOUNTER SYMPTOMS
VOMITING: 0
MYALGIAS: 0
WEIGHT LOSS: 0
NECK PAIN: 0
SENSORY CHANGE: 0
NAUSEA: 0

## 2024-03-07 ASSESSMENT — PAIN SCALES - GENERAL: PAINLEVEL: 5=MODERATE PAIN

## 2024-03-07 NOTE — PROGRESS NOTES
"Subjective     Jax Yang is a 38 y.o. male who presents with Spasms (Low back muscle /Started Tuesday of this week )            2 days low back pain and spasm.  Symptoms were triggered by bending over and reaching into a fish tank.  He also notes shoveling snow a few days prior to this.  Pain waxes and wanes.  At times severe.  Radiates to bilateral buttocks and lateral thighs.  No weakness.  No myelopathy.  No fever.  PMH similar in the past that responded well to methocarbamol. No other aggravating or alleviating factors.        Review of Systems   Constitutional:  Negative for malaise/fatigue and weight loss.   Gastrointestinal:  Negative for nausea and vomiting.   Musculoskeletal:  Negative for joint pain, myalgias and neck pain.   Skin:  Negative for itching and rash.   Neurological:  Negative for sensory change.              Objective     /78 (BP Location: Left arm, Patient Position: Sitting, BP Cuff Size: Adult)   Pulse 89   Temp 36.6 °C (97.9 °F) (Temporal)   Resp 16   Ht 1.702 m (5' 7\")   Wt 93.9 kg (207 lb)   SpO2 100%   BMI 32.42 kg/m²      Physical Exam  Constitutional:       Appearance: Normal appearance.   Musculoskeletal:      Comments: Tender bilateral paralumbar musculature.  No midline bony tenderness or step-off.  Guards with range of motion.   Neurological:      Mental Status: He is alert.      Comments: Bilateral lower extremity strength and sensory intact.                             Assessment & Plan        1. Strain of lumbar region, initial encounter    - methocarbamol (ROBAXIN) 500 MG Tab; Take 1 Tablet by mouth 3 times a day as needed (low back spasm) for up to 30 days.  Dispense: 30 Tablet; Refill: 0    2. Spasm    - methocarbamol (ROBAXIN) 500 MG Tab; Take 1 Tablet by mouth 3 times a day as needed (low back spasm) for up to 30 days.  Dispense: 30 Tablet; Refill: 0    Differential diagnosis, natural history, supportive care, and indications for immediate follow-up " were discussed.

## 2024-03-30 ENCOUNTER — APPOINTMENT (OUTPATIENT)
Dept: RADIOLOGY | Facility: MEDICAL CENTER | Age: 39
End: 2024-03-30
Attending: STUDENT IN AN ORGANIZED HEALTH CARE EDUCATION/TRAINING PROGRAM
Payer: COMMERCIAL

## 2024-03-30 ENCOUNTER — APPOINTMENT (OUTPATIENT)
Dept: RADIOLOGY | Facility: MEDICAL CENTER | Age: 39
End: 2024-03-30
Attending: EMERGENCY MEDICINE
Payer: COMMERCIAL

## 2024-03-30 ENCOUNTER — HOSPITAL ENCOUNTER (EMERGENCY)
Facility: MEDICAL CENTER | Age: 39
End: 2024-03-30
Attending: STUDENT IN AN ORGANIZED HEALTH CARE EDUCATION/TRAINING PROGRAM
Payer: COMMERCIAL

## 2024-03-30 ENCOUNTER — APPOINTMENT (OUTPATIENT)
Dept: RADIOLOGY | Facility: REHABILITATION | Age: 39
End: 2024-03-30
Attending: EMERGENCY MEDICINE
Payer: COMMERCIAL

## 2024-03-30 ENCOUNTER — HOSPITAL ENCOUNTER (EMERGENCY)
Facility: MEDICAL CENTER | Age: 39
End: 2024-03-30
Attending: EMERGENCY MEDICINE
Payer: COMMERCIAL

## 2024-03-30 VITALS
TEMPERATURE: 97.8 F | DIASTOLIC BLOOD PRESSURE: 80 MMHG | OXYGEN SATURATION: 92 % | RESPIRATION RATE: 20 BRPM | WEIGHT: 200 LBS | HEIGHT: 67 IN | BODY MASS INDEX: 31.39 KG/M2 | HEART RATE: 72 BPM | SYSTOLIC BLOOD PRESSURE: 126 MMHG

## 2024-03-30 VITALS
TEMPERATURE: 97.5 F | OXYGEN SATURATION: 95 % | HEART RATE: 87 BPM | DIASTOLIC BLOOD PRESSURE: 69 MMHG | WEIGHT: 207.89 LBS | BODY MASS INDEX: 32.56 KG/M2 | RESPIRATION RATE: 17 BRPM | SYSTOLIC BLOOD PRESSURE: 119 MMHG

## 2024-03-30 DIAGNOSIS — M62.830 BACK SPASM: ICD-10-CM

## 2024-03-30 DIAGNOSIS — M62.838 MUSCLE SPASM: ICD-10-CM

## 2024-03-30 LAB
ALBUMIN SERPL BCP-MCNC: 4.7 G/DL (ref 3.2–4.9)
ALBUMIN/GLOB SERPL: 1.9 G/DL
ALP SERPL-CCNC: 73 U/L (ref 30–99)
ALT SERPL-CCNC: 30 U/L (ref 2–50)
ANION GAP SERPL CALC-SCNC: 15 MMOL/L (ref 7–16)
AST SERPL-CCNC: 34 U/L (ref 12–45)
BASOPHILS # BLD AUTO: 0.5 % (ref 0–1.8)
BASOPHILS # BLD: 0.03 K/UL (ref 0–0.12)
BILIRUB SERPL-MCNC: 0.3 MG/DL (ref 0.1–1.5)
BUN SERPL-MCNC: 12 MG/DL (ref 8–22)
CALCIUM ALBUM COR SERPL-MCNC: 8.8 MG/DL (ref 8.5–10.5)
CALCIUM SERPL-MCNC: 9.4 MG/DL (ref 8.5–10.5)
CHLORIDE SERPL-SCNC: 98 MMOL/L (ref 96–112)
CO2 SERPL-SCNC: 22 MMOL/L (ref 20–33)
CREAT SERPL-MCNC: 0.73 MG/DL (ref 0.5–1.4)
EKG IMPRESSION: NORMAL
EOSINOPHIL # BLD AUTO: 0.07 K/UL (ref 0–0.51)
EOSINOPHIL NFR BLD: 1.1 % (ref 0–6.9)
ERYTHROCYTE [DISTWIDTH] IN BLOOD BY AUTOMATED COUNT: 36 FL (ref 35.9–50)
GFR SERPLBLD CREATININE-BSD FMLA CKD-EPI: 120 ML/MIN/1.73 M 2
GLOBULIN SER CALC-MCNC: 2.5 G/DL (ref 1.9–3.5)
GLUCOSE SERPL-MCNC: 96 MG/DL (ref 65–99)
HCT VFR BLD AUTO: 42 % (ref 42–52)
HGB BLD-MCNC: 14.3 G/DL (ref 14–18)
IMM GRANULOCYTES # BLD AUTO: 0.02 K/UL (ref 0–0.11)
IMM GRANULOCYTES NFR BLD AUTO: 0.3 % (ref 0–0.9)
LIPASE SERPL-CCNC: 23 U/L (ref 11–82)
LYMPHOCYTES # BLD AUTO: 1.24 K/UL (ref 1–4.8)
LYMPHOCYTES NFR BLD: 19.5 % (ref 22–41)
MCH RBC QN AUTO: 27.4 PG (ref 27–33)
MCHC RBC AUTO-ENTMCNC: 34 G/DL (ref 32.3–36.5)
MCV RBC AUTO: 80.6 FL (ref 81.4–97.8)
MONOCYTES # BLD AUTO: 0.37 K/UL (ref 0–0.85)
MONOCYTES NFR BLD AUTO: 5.8 % (ref 0–13.4)
NEUTROPHILS # BLD AUTO: 4.62 K/UL (ref 1.82–7.42)
NEUTROPHILS NFR BLD: 72.8 % (ref 44–72)
NRBC # BLD AUTO: 0 K/UL
NRBC BLD-RTO: 0 /100 WBC (ref 0–0.2)
PLATELET # BLD AUTO: 310 K/UL (ref 164–446)
PMV BLD AUTO: 10 FL (ref 9–12.9)
POTASSIUM SERPL-SCNC: 4 MMOL/L (ref 3.6–5.5)
PROT SERPL-MCNC: 7.2 G/DL (ref 6–8.2)
RBC # BLD AUTO: 5.21 M/UL (ref 4.7–6.1)
SODIUM SERPL-SCNC: 135 MMOL/L (ref 135–145)
TROPONIN T SERPL-MCNC: 9 NG/L (ref 6–19)
WBC # BLD AUTO: 6.4 K/UL (ref 4.8–10.8)

## 2024-03-30 PROCEDURE — 80053 COMPREHEN METABOLIC PANEL: CPT

## 2024-03-30 PROCEDURE — 96374 THER/PROPH/DIAG INJ IV PUSH: CPT

## 2024-03-30 PROCEDURE — 99284 EMERGENCY DEPT VISIT MOD MDM: CPT

## 2024-03-30 PROCEDURE — 72128 CT CHEST SPINE W/O DYE: CPT

## 2024-03-30 PROCEDURE — 84484 ASSAY OF TROPONIN QUANT: CPT

## 2024-03-30 PROCEDURE — 96375 TX/PRO/DX INJ NEW DRUG ADDON: CPT

## 2024-03-30 PROCEDURE — 700111 HCHG RX REV CODE 636 W/ 250 OVERRIDE (IP): Performed by: STUDENT IN AN ORGANIZED HEALTH CARE EDUCATION/TRAINING PROGRAM

## 2024-03-30 PROCEDURE — 93005 ELECTROCARDIOGRAM TRACING: CPT

## 2024-03-30 PROCEDURE — 83690 ASSAY OF LIPASE: CPT

## 2024-03-30 PROCEDURE — 36415 COLL VENOUS BLD VENIPUNCTURE: CPT

## 2024-03-30 PROCEDURE — 93005 ELECTROCARDIOGRAM TRACING: CPT | Performed by: EMERGENCY MEDICINE

## 2024-03-30 PROCEDURE — 700111 HCHG RX REV CODE 636 W/ 250 OVERRIDE (IP): Performed by: EMERGENCY MEDICINE

## 2024-03-30 PROCEDURE — 71275 CT ANGIOGRAPHY CHEST: CPT

## 2024-03-30 PROCEDURE — 85025 COMPLETE CBC W/AUTO DIFF WBC: CPT

## 2024-03-30 PROCEDURE — 700117 HCHG RX CONTRAST REV CODE 255: Performed by: EMERGENCY MEDICINE

## 2024-03-30 PROCEDURE — 71045 X-RAY EXAM CHEST 1 VIEW: CPT

## 2024-03-30 RX ORDER — HYDROMORPHONE HYDROCHLORIDE 1 MG/ML
1 INJECTION, SOLUTION INTRAMUSCULAR; INTRAVENOUS; SUBCUTANEOUS ONCE
Status: COMPLETED | OUTPATIENT
Start: 2024-03-30 | End: 2024-03-30

## 2024-03-30 RX ORDER — LIDOCAINE 50 MG/G
1 PATCH TOPICAL EVERY 24 HOURS
Qty: 10 PATCH | Refills: 0 | Status: SHIPPED | OUTPATIENT
Start: 2024-03-30

## 2024-03-30 RX ORDER — KETOROLAC TROMETHAMINE 15 MG/ML
15 INJECTION, SOLUTION INTRAMUSCULAR; INTRAVENOUS ONCE
Status: COMPLETED | OUTPATIENT
Start: 2024-03-30 | End: 2024-03-30

## 2024-03-30 RX ORDER — DIAZEPAM 5 MG/ML
5 INJECTION, SOLUTION INTRAMUSCULAR; INTRAVENOUS ONCE
Status: COMPLETED | OUTPATIENT
Start: 2024-03-30 | End: 2024-03-30

## 2024-03-30 RX ADMIN — IOHEXOL 96 ML: 350 INJECTION, SOLUTION INTRAVENOUS at 18:30

## 2024-03-30 RX ADMIN — KETOROLAC TROMETHAMINE 15 MG: 15 INJECTION, SOLUTION INTRAMUSCULAR; INTRAVENOUS at 02:39

## 2024-03-30 RX ADMIN — DIAZEPAM 5 MG: 5 INJECTION, SOLUTION INTRAMUSCULAR; INTRAVENOUS at 17:47

## 2024-03-30 RX ADMIN — HYDROMORPHONE HYDROCHLORIDE 1 MG: 1 INJECTION, SOLUTION INTRAMUSCULAR; INTRAVENOUS; SUBCUTANEOUS at 02:35

## 2024-03-30 RX ADMIN — KETOROLAC TROMETHAMINE 15 MG: 15 INJECTION, SOLUTION INTRAMUSCULAR; INTRAVENOUS at 17:42

## 2024-03-30 ASSESSMENT — PAIN DESCRIPTION - PAIN TYPE
TYPE: ACUTE PAIN

## 2024-03-30 ASSESSMENT — LIFESTYLE VARIABLES: DO YOU DRINK ALCOHOL: NO

## 2024-03-30 NOTE — ED NOTES
Reports mid upper back pain, sharp x 0000 today. Report pain 10/10. Endorses pain to BLE upon the start of the back pain. Iv cath in place

## 2024-03-30 NOTE — ED NOTES
Pt ambulates to yellow 54 with steady gait    ABCs intact. A+Ox4. Skin PWD.    Pt marked up for ERP

## 2024-03-30 NOTE — ED TRIAGE NOTES
Jax Yang  38 y.o. male  Chief Complaint   Patient presents with    Back Pain     Upper back pain / spasm radiating to both legs. Awakened tonight by pain. Seen at  3 weeks ago for lower back pain/spasm and was prescribed Robaxin. Denies trauma. Took 2 motrins before coming to ED. Appears to be in severe pain.      Pt on wheelchair for above complaint.     Pt is GCS 15, speaking in full sentences, follows commands and responds appropriately to questions. Resp are even and unlabored.     Pt placed in ED lobby. Pt educated on triage process. Pt encouraged to alert staff for any changes.       Vitals:    03/30/24 0137   BP: (!) 145/94   Pulse: (!) 109   Resp: 20   Temp: 36.1 °C (96.9 °F)   SpO2: 95%

## 2024-03-30 NOTE — ED PROVIDER NOTES
ED Provider Note    CHIEF COMPLAINT  Chief Complaint   Patient presents with    Back Pain     Upper back pain / spasm radiating to both legs. Awakened tonight by pain. Seen at  3 weeks ago for lower back pain/spasm and was prescribed Robaxin. Denies trauma. Took 2 motrins before coming to ED. Appears to be in severe pain.        EXTERNAL RECORDS REVIEWED  Outpatient Notes 3/7/2024 seen at urgent care for lumbar strain    HPI/ROS  LIMITATION TO HISTORY   Select: : None  OUTSIDE HISTORIAN(S):      Jax Yang is a 38 y.o. male who presents for severe mid thoracic back pain that awoke him from sleep earlier tonight.  Patient reports it feels like a muscle spasm in the middle of his upper back.  Reports it radiates down his back into his legs.  Worsened with certain movements and positions.  No associated chest pain, shortness of breath, vomiting, fever    PAST MEDICAL HISTORY   has a past medical history of Displaced fracture of base of fifth metacarpal bone, right hand, subsequent encounter for fracture with routine healing (7/1/2020), Hypertension, Insomnia (4/19/2016), Lyme disease (2012), Palpitations (7/30/2020), Psychiatric problem, and Tobacco abuse (11/18/2014).    SURGICAL HISTORY   has a past surgical history that includes appendectomy; tympanoplasty (Left, 1/7/2020); canaloplasty (Left, 1/7/2020); ear cartilage graft to face (Left, 3/18/2021); and tympanoplasty (Left, 3/18/2021).    FAMILY HISTORY  Family History   Problem Relation Age of Onset    Hypertension Mother     Cancer Father         lung, mets age 56    Hypertension Father     Sleep Apnea Father     Hypertension Brother     Colorectal Cancer Daughter     Diabetes Neg Hx     Heart Disease Neg Hx     Ovarian Cancer Neg Hx     Tubal Cancer Neg Hx     Peritoneal Cancer Neg Hx     Breast Cancer Neg Hx     Hyperlipidemia Neg Hx     Stroke Neg Hx        SOCIAL HISTORY  Social History     Tobacco Use    Smoking status: Former     Current  "packs/day: 0.00     Average packs/day: 0.3 packs/day for 8.0 years (2.0 ttl pk-yrs)     Types: Cigarettes     Start date: 2007     Quit date: 2015     Years since quittin.9    Smokeless tobacco: Never    Tobacco comments:     quit Dec 2015   Vaping Use    Vaping Use: Never used   Substance and Sexual Activity    Alcohol use: Yes     Alcohol/week: 0.0 oz     Comment: 1 per day    Drug use: No    Sexual activity: Yes     Partners: Female       CURRENT MEDICATIONS  Home Medications       Reviewed by Gwen Ramey R.N. (Registered Nurse) on 24 at 0152  Med List Status: Partial     Medication Last Dose Status   cyanocobalamin (VITAMIN B-12) 500 MCG Tab  Active   escitalopram (LEXAPRO) 10 MG Tab  Active   lisinopril (PRINIVIL) 10 MG Tab  Active   methocarbamol (ROBAXIN) 500 MG Tab  Active   Omega-3 Fatty Acids (FISH OIL) 1000 MG Cap capsule  Active   Probiotic Product (PROBIOTIC BLEND PO)  Active   psyllium (METAMUCIL) 58.12 % Pack  Active   testosterone cypionate (DEPO-TESTOSTERONE) 200 MG/ML injection  Active   vitamin D3 (CHOLECALCIFEROL) 1000 Unit (25 mcg) Tab  Active                    ALLERGIES  No Known Allergies    PHYSICAL EXAM  VITAL SIGNS: /80   Pulse 72   Temp 36.6 °C (97.8 °F) (Temporal)   Resp 20   Ht 1.702 m (5' 7\")   Wt 90.7 kg (200 lb)   SpO2 92%   BMI 31.32 kg/m²    Physical Exam  Vitals and nursing note reviewed.   Constitutional:       Appearance: He is well-developed.   HENT:      Head: Normocephalic.   Cardiovascular:      Rate and Rhythm: Normal rate and regular rhythm.      Heart sounds: No murmur heard.  Pulmonary:      Effort: Pulmonary effort is normal.      Breath sounds: Normal breath sounds.   Abdominal:      Palpations: Abdomen is soft.      Tenderness: There is no abdominal tenderness.   Musculoskeletal:         General: Normal range of motion.      Right lower leg: No edema.      Left lower leg: No edema.   Skin:     General: Skin is warm.   Neurological: "      General: No focal deficit present.      Mental Status: He is alert and oriented to person, place, and time. Mental status is at baseline.      Cranial Nerves: No cranial nerve deficit.      Sensory: No sensory deficit.      Motor: No weakness.      Coordination: Coordination normal.      Gait: Gait normal.         EKG/LABS    I have independently interpreted this EKG    RADIOLOGY  I have independently interpreted the diagnostic imaging associated with this visit and am waiting the final reading from the radiologist.   My preliminary interpretation is as follows: Chest x-ray no acute process    Radiologist interpretation:  DX-CHEST-PORTABLE (1 VIEW)   Final Result      No acute process.          COURSE & MEDICAL DECISION MAKING    ASSESSMENT, COURSE AND PLAN  Care Narrative: 38-year-old male history of hypertension who presents to the ED for severe musculoskeletal back pain awaking him from sleep on exam he appears incredibly uncomfortable there is reproducible tenderness throughout the mid thoracic paraspinal region there is no midline tenderness, fevers or other red flag signs of back pain.  The patient was aggressively managed with multimodal pain therapy and upon reassessment had near resolution of his symptoms.  I did obtain a chest x-ray to rule out spontaneous pneumothorax and this was within normal limits.  No mediastinal widening to indicate aortic dissection and I have an incredibly low clinical suspicion for this to begin with.  Do not feel the patient would benefit from further cross-sectional imaging or laboratory workup at this time.  Discussed with patient additional multimodal pain therapy at home and return precautions for worsening or development of chest pain, shortness of breath, weakness              ADDITIONAL PROBLEMS MANAGED  Past Medical History:   Diagnosis Date    Displaced fracture of base of fifth metacarpal bone, right hand, subsequent encounter for fracture with routine healing  7/1/2020    Hypertension     Insomnia 4/19/2016    Lyme disease 2012    Palpitations 7/30/2020    Psychiatric problem     Tobacco abuse 11/18/2014       DISPOSITION AND DISCUSSIONS  I have discussed management of the patient with the following physicians and SHANE's:      Discussion of management with other QHP or appropriate source(s):      Escalation of care considered, and ultimately not performed:Laboratory analysis and diagnostic imaging    Barriers to care at this time, including but not limited to: .     Decision tools and prescription drugs considered including, but not limited to: Pain Medications lidocaine patches .    FINAL DIAGNOSIS  1. Back spasm           Electronically signed by: Don Griffin M.D., 3/30/2024 2:26 AM

## 2024-03-30 NOTE — ED TRIAGE NOTES
Jax Yang  38 y.o. male  Chief Complaint   Patient presents with    Spasms     Patient reports spasms between his shoulder blades that started last night around midnight. Patient was seen in the ER last night and reports he has relief until noon today when they returned.     Palpitations     X 2 days        Vitals:    03/30/24 1611   BP: (!) 180/101   Pulse: 99   Resp: 16   Temp: 35.8 °C (96.5 °F)   SpO2: 98%       Triage process explained to patient, apologized for wait time[facility]  Pt informed to notify staff of any change in condition.

## 2024-03-31 NOTE — ED NOTES
PIV started by assist RN with blood drawn and sent to lab.    Pt medicated per MAR. Pt requesting to hold off on ketamine due to disliking medications that cause any disorientation. Pt did request the valium to help with the spasms. Pt informed if he changes mind on ketamine he can let me know.    Pt updated on POC

## 2024-05-08 ENCOUNTER — OFFICE VISIT (OUTPATIENT)
Dept: MEDICAL GROUP | Facility: PHYSICIAN GROUP | Age: 39
End: 2024-05-08
Payer: COMMERCIAL

## 2024-05-08 VITALS
DIASTOLIC BLOOD PRESSURE: 78 MMHG | HEART RATE: 92 BPM | TEMPERATURE: 98.1 F | WEIGHT: 207 LBS | RESPIRATION RATE: 16 BRPM | SYSTOLIC BLOOD PRESSURE: 110 MMHG | HEIGHT: 67 IN | BODY MASS INDEX: 32.49 KG/M2 | OXYGEN SATURATION: 98 %

## 2024-05-08 DIAGNOSIS — B07.9 WART OF HAND: ICD-10-CM

## 2024-05-08 PROCEDURE — 17110 DESTRUCTION B9 LES UP TO 14: CPT | Performed by: NURSE PRACTITIONER

## 2024-05-08 ASSESSMENT — FIBROSIS 4 INDEX: FIB4 SCORE: 0.74

## 2024-05-08 NOTE — PROGRESS NOTES
"Family Nurse Practitioner Student Note    Cosigner/Preceptor: HARVINDER Gupta    Chief Complaint:                                                                                                                                 Chief Complaint   Patient presents with    Warts     On left index finger        HPI:   Jax presents today with the following.    Current Outpatient Medications   Medication Sig Dispense Refill    testosterone cypionate (DEPO-TESTOSTERONE) 200 MG/ML injection INJECT 0.6 IN THE MUSCLE EVERY 7 DAYS      lisinopril (PRINIVIL) 10 MG Tab Take 1 Tablet by mouth every day. 90 Tablet 3    escitalopram (LEXAPRO) 10 MG Tab Take 1 Tablet by mouth every day. 90 Tablet 3    Omega-3 Fatty Acids (FISH OIL) 1000 MG Cap capsule Take 1,000 mg by mouth 3 times a day with meals.      vitamin D3 (CHOLECALCIFEROL) 1000 Unit (25 mcg) Tab Take 1,000 Units by mouth every day.      Probiotic Product (PROBIOTIC BLEND PO) Take  by mouth.      psyllium (METAMUCIL) 58.12 % Pack Take 1 Packet by mouth every day.      cyanocobalamin (VITAMIN B-12) 500 MCG Tab Take 500 mcg by mouth every day.      lidocaine (LIDODERM) 5 % Patch Place 1 Patch on the skin every 24 hours. 10 Patch 0    methylPREDNISolone (MEDROL DOSEPAK) 4 MG Tablet Therapy Pack Follow schedule on package instructions. 21 Tablet 0     No current facility-administered medications for this visit.       Allergies as of 05/08/2024    (No Known Allergies)        ROS:  All systems negative expect as addressed in assessment and plan.     Assessment:  /78 (BP Location: Left arm, Patient Position: Sitting)   Pulse 92   Temp 36.7 °C (98.1 °F) (Temporal)   Resp 16   Ht 1.702 m (5' 7\")   Wt 93.9 kg (207 lb)   SpO2 98%   BMI 32.42 kg/m²     Physical Exam  Skin:     Findings: Lesion present.             Comments: Wart on left index finger         Plan:  38 y.o. male with the following issues.    1. Wart of hand  Jeannie Dugan - Benign    Acute    Patient " presents for wart removal of left index finger. Cryotherapy completed. See procedure note           Return in about 2 weeks (around 5/22/2024), or if symptoms worsen or fail to improve.

## 2024-05-08 NOTE — PROCEDURES
Jeannie Dugan - Benign    Date/Time: 5/8/2024 9:15 AM    Performed by: Raisa Montaño, Student  Authorized by: Laura Guardado, A.P.R.N.    Number of Lesions: 1  Lesion 1:     Body area: upper extremity    Upper extremity location: L index finger    Initial size (mm): 10    Final defect size (mm): 10    Malignancy: benign lesion      Destruction method: cryotherapy      Cryo area: Wart of Left index finger    Cryo cycles: 3      Comments:  Patient presents for cryotherapy of wart on left index finger. He has attempted multiple treatments at home, but the wart gets smaller and then comes back. Procedure tolerated and patient discharged in stable condition.              Principal Discharge DX:	Hyperglycemia

## 2024-05-21 ENCOUNTER — OFFICE VISIT (OUTPATIENT)
Dept: MEDICAL GROUP | Facility: PHYSICIAN GROUP | Age: 39
End: 2024-05-21
Payer: COMMERCIAL

## 2024-05-21 VITALS
OXYGEN SATURATION: 98 % | WEIGHT: 205 LBS | BODY MASS INDEX: 32.18 KG/M2 | TEMPERATURE: 98 F | HEIGHT: 67 IN | HEART RATE: 99 BPM | RESPIRATION RATE: 16 BRPM | DIASTOLIC BLOOD PRESSURE: 70 MMHG | SYSTOLIC BLOOD PRESSURE: 118 MMHG

## 2024-05-21 DIAGNOSIS — B07.9 WART OF HAND: ICD-10-CM

## 2024-05-21 PROCEDURE — 17110 DESTRUCTION B9 LES UP TO 14: CPT | Performed by: NURSE PRACTITIONER

## 2024-05-21 ASSESSMENT — FIBROSIS 4 INDEX: FIB4 SCORE: 0.74

## 2024-05-21 NOTE — PROGRESS NOTES
"  Chief Complaint   Patient presents with    Follow-Up     Wart                                                                                                                                        HPI:   Jax presents today with the following.    Recalcitrant viral wart on right index finger    ROS:  All systems negative expect as addressed in assessment and plan.     /70 (BP Location: Left arm, Patient Position: Sitting)   Pulse 99   Temp 36.7 °C (98 °F) (Temporal)   Resp 16   Ht 1.702 m (5' 7\")   Wt 93 kg (205 lb)   SpO2 98%   BMI 32.11 kg/m²     Objective:    Physical Exam  Skin:     Findings: Lesion present.      Comments: Wart on 1st knuckle of right index finger       Assessment and Plan:  38 y.o. male with the following issues.    1. Wart of hand  - Consent for all Surgical, Special Diagnostic or Therapeutic Procedures  - Lesn Destn - Benign    Return in about 2 weeks (around 6/4/2024) for if no improvement.      Please note that this dictation was created using voice recognition software. I have worked with consultants from the vendor as well as technical experts from [facility] to optimize the interface. I have made every reasonable attempt to correct obvious errors, but I expect that there are errors of grammar and possibly content that I did not discover before finalizing the note.       "

## 2024-05-21 NOTE — PROCEDURES
Jeannie Menendezahi - Benign    Date/Time: 5/21/2024 11:14 AM    Performed by: GEORGE Wilson  Authorized by: OSMAN WilsonRHUMERA    Number of Lesions: 1  Lesion 1:     Body area: upper extremity    Upper extremity location: R index finger    Initial size (mm): 8    Malignancy: benign lesion      Malignancy comment: viral wart    Destruction method: chemical removal      Destruction method comment: Candida albicans antigen      Comments:  Written consent obtain.     Patient with recalcitrant wart on right index finger unresponsive to 2 different treatments with cryotherapy.     Finger cleansed with chlorhexidine. TB syringe used to superficially inject candida albicans antigen into area of wart. Bandaid placed over site. Patient advised to keep lesion clean and dry. Patient to cover at work.     Patient to return or notify provider if wart has not resolved within 2 weeks.

## 2024-06-12 ENCOUNTER — OFFICE VISIT (OUTPATIENT)
Dept: MEDICAL GROUP | Facility: PHYSICIAN GROUP | Age: 39
End: 2024-06-12
Payer: COMMERCIAL

## 2024-06-12 VITALS
HEIGHT: 67 IN | RESPIRATION RATE: 16 BRPM | DIASTOLIC BLOOD PRESSURE: 70 MMHG | SYSTOLIC BLOOD PRESSURE: 108 MMHG | OXYGEN SATURATION: 96 % | HEART RATE: 86 BPM | BODY MASS INDEX: 32.18 KG/M2 | WEIGHT: 205 LBS | TEMPERATURE: 98 F

## 2024-06-12 DIAGNOSIS — B07.9 WART OF HAND: ICD-10-CM

## 2024-06-12 PROCEDURE — 17110 DESTRUCTION B9 LES UP TO 14: CPT | Performed by: NURSE PRACTITIONER

## 2024-06-12 ASSESSMENT — FIBROSIS 4 INDEX: FIB4 SCORE: 0.74

## 2024-06-12 NOTE — PROGRESS NOTES
"  Chief Complaint   Patient presents with    Follow-Up     Wart of hand/ not better                                                                                                                                       HPI:   Jax presents today with the following.    The patient attended the consultation today to provide an update on their condition following the recent treatment.    The patient reports that their finger is getting bigger since the last visit approximately three weeks ago when they received an injection. They mention that the finger is now softer than before. The patient recalls that the injection caused pressure and discomfort for about a week. They also mention that prior to the treatment, the finger used to hurt when touched, but it doesn't hurt now.    The patient is left-handed for writing but does most other activities, including shooting, with their right hand.    The patient's current condition, specifically the changes in their finger after receiving the injection, has been discussed. Their experience with the injection and its effects, as well as their handedness, have been noted.    ROS:  All systems negative expect as addressed in assessment and plan.     /70 (BP Location: Left arm, Patient Position: Sitting)   Pulse 86   Temp 36.7 °C (98 °F) (Temporal)   Resp 16   Ht 1.702 m (5' 7\")   Wt 93 kg (205 lb)   SpO2 96%   BMI 32.11 kg/m²     Objective:    Physical Examination:  - Finger Examination: The patient's finger is observed to be enlarged and softer compared to its previous condition. This softening occurred following a prior intervention approximately three weeks ago. No pain was reported by the patient during the current manipulation and treatment of the finger.      Assessment and Plan:  38 y.o. male with the following issues.    1. Wart of hand    1. Wart on Finger:     - Patient reports the wart has increased in size but has softened since the last injection three " weeks ago.     - Cryotherapy was performed today to further irritate the wart and promote healing.     - If the wart persists in two to three weeks, consider another round of immunotherapy, pending availability.     - Patient advised to monitor for blistering or callusing and may remove callus if it forms.      Return if symptoms worsen or fail to improve.      Please note that this dictation was created using voice recognition software. I have worked with consultants from the vendor as well as technical experts from [employer] to optimize the interface. I have made every reasonable attempt to correct obvious errors, but I expect that there are errors of grammar and possibly content that I did not discover before finalizing the note.

## 2024-06-12 NOTE — PROCEDURES
Jeannie Dugan - Benign    Date/Time: 6/12/2024 12:43 PM    Performed by: OSMAN WilsonRHUMERA  Authorized by: NADIYA WilsonP.RHUMERA    Number of Lesions: 1  Lesion 1:     Body area: upper extremity    Upper extremity location: L index finger    Initial size (mm): 8    Malignancy: benign lesion      Destruction method: cryotherapy      Cryo cycles: 4

## 2024-07-02 ENCOUNTER — APPOINTMENT (OUTPATIENT)
Dept: MEDICAL GROUP | Facility: PHYSICIAN GROUP | Age: 39
End: 2024-07-02
Payer: COMMERCIAL

## 2024-07-02 VITALS
DIASTOLIC BLOOD PRESSURE: 60 MMHG | BODY MASS INDEX: 32.18 KG/M2 | SYSTOLIC BLOOD PRESSURE: 108 MMHG | HEART RATE: 96 BPM | WEIGHT: 205 LBS | TEMPERATURE: 97.8 F | RESPIRATION RATE: 16 BRPM | HEIGHT: 67 IN | OXYGEN SATURATION: 97 %

## 2024-07-02 DIAGNOSIS — B07.9 WART OF HAND: ICD-10-CM

## 2024-07-02 DIAGNOSIS — Z30.09 VASECTOMY EVALUATION: ICD-10-CM

## 2024-07-02 PROCEDURE — 3078F DIAST BP <80 MM HG: CPT | Performed by: NURSE PRACTITIONER

## 2024-07-02 PROCEDURE — 3074F SYST BP LT 130 MM HG: CPT | Performed by: NURSE PRACTITIONER

## 2024-07-02 PROCEDURE — 99213 OFFICE O/P EST LOW 20 MIN: CPT | Mod: 25 | Performed by: NURSE PRACTITIONER

## 2024-07-02 RX ORDER — ALPRAZOLAM 0.5 MG/1
TABLET ORAL
Qty: 2 TABLET | Refills: 0 | Status: SHIPPED | OUTPATIENT
Start: 2024-07-02 | End: 2024-07-03

## 2024-07-02 ASSESSMENT — FIBROSIS 4 INDEX: FIB4 SCORE: 0.74

## 2024-07-08 PROBLEM — Z30.09 VASECTOMY EVALUATION: Status: ACTIVE | Noted: 2024-07-08

## 2024-07-08 PROCEDURE — 17110 DESTRUCTION B9 LES UP TO 14: CPT | Performed by: NURSE PRACTITIONER

## 2024-12-03 ENCOUNTER — HOSPITAL ENCOUNTER (EMERGENCY)
Facility: MEDICAL CENTER | Age: 39
End: 2024-12-03
Attending: STUDENT IN AN ORGANIZED HEALTH CARE EDUCATION/TRAINING PROGRAM
Payer: COMMERCIAL

## 2024-12-03 ENCOUNTER — APPOINTMENT (OUTPATIENT)
Dept: RADIOLOGY | Facility: MEDICAL CENTER | Age: 39
End: 2024-12-03
Attending: STUDENT IN AN ORGANIZED HEALTH CARE EDUCATION/TRAINING PROGRAM
Payer: COMMERCIAL

## 2024-12-03 VITALS
RESPIRATION RATE: 22 BRPM | DIASTOLIC BLOOD PRESSURE: 109 MMHG | HEIGHT: 67 IN | HEART RATE: 125 BPM | OXYGEN SATURATION: 95 % | TEMPERATURE: 97.1 F | SYSTOLIC BLOOD PRESSURE: 171 MMHG | WEIGHT: 204.81 LBS | BODY MASS INDEX: 32.15 KG/M2

## 2024-12-03 DIAGNOSIS — R51.9 FACIAL PAIN: ICD-10-CM

## 2024-12-03 DIAGNOSIS — J32.0 MAXILLARY SINUSITIS, UNSPECIFIED CHRONICITY: ICD-10-CM

## 2024-12-03 LAB
ALBUMIN SERPL BCP-MCNC: 4.3 G/DL (ref 3.2–4.9)
ALBUMIN/GLOB SERPL: 1.7 G/DL
ALP SERPL-CCNC: 100 U/L (ref 30–99)
ALT SERPL-CCNC: 33 U/L (ref 2–50)
ANION GAP SERPL CALC-SCNC: 14 MMOL/L (ref 7–16)
AST SERPL-CCNC: 31 U/L (ref 12–45)
BASOPHILS # BLD AUTO: 0.3 % (ref 0–1.8)
BASOPHILS # BLD: 0.03 K/UL (ref 0–0.12)
BILIRUB SERPL-MCNC: 0.2 MG/DL (ref 0.1–1.5)
BUN SERPL-MCNC: 11 MG/DL (ref 8–22)
CALCIUM ALBUM COR SERPL-MCNC: 9 MG/DL (ref 8.5–10.5)
CALCIUM SERPL-MCNC: 9.2 MG/DL (ref 8.5–10.5)
CHLORIDE SERPL-SCNC: 104 MMOL/L (ref 96–112)
CO2 SERPL-SCNC: 19 MMOL/L (ref 20–33)
CREAT SERPL-MCNC: 0.9 MG/DL (ref 0.5–1.4)
EOSINOPHIL # BLD AUTO: 0.12 K/UL (ref 0–0.51)
EOSINOPHIL NFR BLD: 1.4 % (ref 0–6.9)
ERYTHROCYTE [DISTWIDTH] IN BLOOD BY AUTOMATED COUNT: 48 FL (ref 35.9–50)
GFR SERPLBLD CREATININE-BSD FMLA CKD-EPI: 112 ML/MIN/1.73 M 2
GLOBULIN SER CALC-MCNC: 2.6 G/DL (ref 1.9–3.5)
GLUCOSE SERPL-MCNC: 116 MG/DL (ref 65–99)
HCT VFR BLD AUTO: 49.5 % (ref 42–52)
HGB BLD-MCNC: 15.7 G/DL (ref 14–18)
IMM GRANULOCYTES # BLD AUTO: 0.02 K/UL (ref 0–0.11)
IMM GRANULOCYTES NFR BLD AUTO: 0.2 % (ref 0–0.9)
LYMPHOCYTES # BLD AUTO: 2.31 K/UL (ref 1–4.8)
LYMPHOCYTES NFR BLD: 26.5 % (ref 22–41)
MCH RBC QN AUTO: 23.2 PG (ref 27–33)
MCHC RBC AUTO-ENTMCNC: 31.7 G/DL (ref 32.3–36.5)
MCV RBC AUTO: 73 FL (ref 81.4–97.8)
MONOCYTES # BLD AUTO: 0.53 K/UL (ref 0–0.85)
MONOCYTES NFR BLD AUTO: 6.1 % (ref 0–13.4)
NEUTROPHILS # BLD AUTO: 5.71 K/UL (ref 1.82–7.42)
NEUTROPHILS NFR BLD: 65.5 % (ref 44–72)
NRBC # BLD AUTO: 0 K/UL
NRBC BLD-RTO: 0 /100 WBC (ref 0–0.2)
PLATELET # BLD AUTO: 322 K/UL (ref 164–446)
PMV BLD AUTO: 9.2 FL (ref 9–12.9)
POTASSIUM SERPL-SCNC: 3.6 MMOL/L (ref 3.6–5.5)
PROT SERPL-MCNC: 6.9 G/DL (ref 6–8.2)
RBC # BLD AUTO: 6.78 M/UL (ref 4.7–6.1)
SODIUM SERPL-SCNC: 137 MMOL/L (ref 135–145)
WBC # BLD AUTO: 8.7 K/UL (ref 4.8–10.8)

## 2024-12-03 PROCEDURE — 96374 THER/PROPH/DIAG INJ IV PUSH: CPT

## 2024-12-03 PROCEDURE — 70487 CT MAXILLOFACIAL W/DYE: CPT

## 2024-12-03 PROCEDURE — 99284 EMERGENCY DEPT VISIT MOD MDM: CPT

## 2024-12-03 PROCEDURE — 700111 HCHG RX REV CODE 636 W/ 250 OVERRIDE (IP): Mod: JZ | Performed by: STUDENT IN AN ORGANIZED HEALTH CARE EDUCATION/TRAINING PROGRAM

## 2024-12-03 PROCEDURE — 80053 COMPREHEN METABOLIC PANEL: CPT

## 2024-12-03 PROCEDURE — 36415 COLL VENOUS BLD VENIPUNCTURE: CPT

## 2024-12-03 PROCEDURE — 85025 COMPLETE CBC W/AUTO DIFF WBC: CPT

## 2024-12-03 PROCEDURE — 70450 CT HEAD/BRAIN W/O DYE: CPT

## 2024-12-03 PROCEDURE — 96375 TX/PRO/DX INJ NEW DRUG ADDON: CPT

## 2024-12-03 PROCEDURE — 700117 HCHG RX CONTRAST REV CODE 255: Performed by: STUDENT IN AN ORGANIZED HEALTH CARE EDUCATION/TRAINING PROGRAM

## 2024-12-03 RX ORDER — MORPHINE SULFATE 4 MG/ML
4 INJECTION INTRAVENOUS ONCE
Status: COMPLETED | OUTPATIENT
Start: 2024-12-03 | End: 2024-12-03

## 2024-12-03 RX ORDER — PROCHLORPERAZINE EDISYLATE 5 MG/ML
10 INJECTION INTRAMUSCULAR; INTRAVENOUS ONCE
Status: COMPLETED | OUTPATIENT
Start: 2024-12-03 | End: 2024-12-03

## 2024-12-03 RX ADMIN — IOHEXOL 80 ML: 350 INJECTION, SOLUTION INTRAVENOUS at 03:46

## 2024-12-03 RX ADMIN — MORPHINE SULFATE 4 MG: 4 INJECTION, SOLUTION INTRAMUSCULAR; INTRAVENOUS at 03:39

## 2024-12-03 RX ADMIN — PROCHLORPERAZINE EDISYLATE 10 MG: 5 INJECTION INTRAMUSCULAR; INTRAVENOUS at 03:39

## 2024-12-03 ASSESSMENT — PAIN DESCRIPTION - PAIN TYPE
TYPE: ACUTE PAIN
TYPE: ACUTE PAIN

## 2024-12-03 ASSESSMENT — FIBROSIS 4 INDEX: FIB4 SCORE: 0.76

## 2024-12-03 NOTE — ED TRIAGE NOTES
"Chief Complaint   Patient presents with    Jaw Pain     L sided jaw/head pain. Px states he had a work injury jan 2019 where they had to reset his jaw. Px states he feels like someone hit his tooth with a hammer. He wears tooth liners and has them changed every ten days, states he had to take them out because the pain is unbearable.        38 yo male to triage for above complaint.     Pt is alert and oriented, speaking in full sentences, follows commands and responds appropriately to questions.     Patient placed back in lobby and educated on triage process. Asked to inform RN of any changes.    BP (!) 150/125   Pulse (!) 125   Temp 36.2 °C (97.1 °F)   Resp (!) 22   Ht 1.702 m (5' 7\")   Wt 92.9 kg (204 lb 12.9 oz)   SpO2 95%   BMI 32.08 kg/m²     "

## 2024-12-03 NOTE — ED PROVIDER NOTES
ED Provider Note    CHIEF COMPLAINT  Chief Complaint   Patient presents with    Jaw Pain     L sided jaw/head pain. Px states he had a work injury jan 2019 where they had to reset his jaw. Px states he feels like someone hit his tooth with a hammer. He wears tooth liners and has them changed every ten days, states he had to take them out because the pain is unbearable.      HPI/ROS  LIMITATION TO HISTORY   Select: : None  OUTSIDE HISTORIAN(S):    Jax Yang is a 39 y.o. male who presents with severe left jaw pain that radiates to the head.  Patient reports that it is the worst headache of his life.  Patient denies it was thunderclap in nature but reports that it was slowly increasing over the past few days.  Patient reports that he wears tooth liners that he thinks may have contributed.  Patient endorses pain to the left jaw.  Patient denies fevers chills body aches or sweats.  Patient denies difficulty breathing or swallowing.    PAST MEDICAL HISTORY   has a past medical history of Displaced fracture of base of fifth metacarpal bone, right hand, subsequent encounter for fracture with routine healing (7/1/2020), Hypertension, Insomnia (4/19/2016), Lyme disease (2012), Palpitations (7/30/2020), Psychiatric problem, and Tobacco abuse (11/18/2014).    SURGICAL HISTORY   has a past surgical history that includes appendectomy; tympanoplasty (Left, 1/7/2020); canaloplasty (Left, 1/7/2020); ear cartilage graft to face (Left, 3/18/2021); and tympanoplasty (Left, 3/18/2021).    FAMILY HISTORY  Family History   Problem Relation Age of Onset    Hypertension Mother     Cancer Father         lung, mets age 56    Hypertension Father     Sleep Apnea Father     Hypertension Brother     Colorectal Cancer Daughter     Diabetes Neg Hx     Heart Disease Neg Hx     Ovarian Cancer Neg Hx     Tubal Cancer Neg Hx     Peritoneal Cancer Neg Hx     Breast Cancer Neg Hx     Hyperlipidemia Neg Hx     Stroke Neg Hx        SOCIAL  "HISTORY  Social History     Tobacco Use    Smoking status: Former     Current packs/day: 0.00     Average packs/day: 0.3 packs/day for 8.0 years (2.0 ttl pk-yrs)     Types: Cigarettes     Start date: 2007     Quit date: 2015     Years since quittin.6    Smokeless tobacco: Never    Tobacco comments:     quit Dec 2015   Vaping Use    Vaping status: Never Used   Substance and Sexual Activity    Alcohol use: Yes     Alcohol/week: 0.0 oz     Comment: 1 per day    Drug use: No    Sexual activity: Yes     Partners: Female       CURRENT MEDICATIONS  Home Medications       Reviewed by Merced Farrell R.N. (Registered Nurse) on 24 at 0236  Med List Status: Partial     Medication Last Dose Status   cyanocobalamin (VITAMIN B-12) 500 MCG Tab  Active   escitalopram (LEXAPRO) 10 MG Tab  Active   lisinopril (PRINIVIL) 10 MG Tab  Active   methylPREDNISolone (MEDROL DOSEPAK) 4 MG Tablet Therapy Pack  Active   Omega-3 Fatty Acids (FISH OIL) 1000 MG Cap capsule  Active   Probiotic Product (PROBIOTIC BLEND PO)  Active   psyllium (METAMUCIL) 58.12 % Pack  Active   testosterone cypionate (DEPO-TESTOSTERONE) 200 MG/ML injection  Active   vitamin D3 (CHOLECALCIFEROL) 1000 Unit (25 mcg) Tab  Active                  Audit from Redirected Encounters    **Home medications have not yet been reviewed for this encounter**         ALLERGIES  No Known Allergies    PHYSICAL EXAM  VITAL SIGNS: BP (!) 171/109   Pulse (!) 125   Temp 36.2 °C (97.1 °F)   Resp (!) 22   Ht 1.702 m (5' 7\")   Wt 92.9 kg (204 lb 12.9 oz)   SpO2 95%   BMI 32.08 kg/m²    Vitals and nursing note reviewed.   Constitutional:       Comments: Patient is lying in bed supine, pleasant, conversant, speaking in complete sentences, uncomfortable appearing  HENT:      Head: Normocephalic and atraumatic.  Oropharynx clear, mild tenderness to the left upper posterior molar.  Some tenderness to the left jaw.  No mastoid tenderness, no cervical spine tenderness  No " facial swelling  No sublingual elevation  Eyes:      Extraocular Movements: Extraocular movements intact.      Conjunctiva/sclera: Conjunctivae normal.      Pupils: Pupils are equal, round, and reactive to light.   Cardiovascular:      Pulses: Normal pulses.      Comments:   Pulmonary:      Effort: Pulmonary effort is normal. No respiratory distress.   Abdominal:      Comments: Abdomen is soft, non-tender, non-distended, non-rigid, no rebound, guarding, masses, no McBurney's point tenderness, no peritoneal signs, negative Rovsing sign, negative Rao sign.  No CVA tenderness to palpation. Benign abdomen.   Musculoskeletal:         General: No swelling. Normal range of motion.      Cervical back: Normal range of motion. No rigidity.   Skin:     General: Skin is warm and dry.      Capillary Refill: Capillary refill takes less than 2 seconds.   Neurological:      Mental Status: Alert.     RADIOLOGY/PROCEDURES   I have independently interpreted the diagnostic imaging associated with this visit and am waiting the final reading from the radiologist.   My preliminary interpretation is as follows: No intracranial hemorrhage    Radiologist interpretation:  CT-MAXILLOFACIAL WITH PLUS RECONS   Final Result         1.  No acute traumatic facial bony injuries identified.   2.  Left maxillary sinusitis changes, lucency and expansion at the tip of the left maxillary second molar tooth roots at the base of the left maxillary sinus, could represent odontogenic source of infection extending into the left maxillary sinus.      CT-HEAD W/O   Final Result         1.  No acute intracranial abnormality.   2.  Left maxillary sinusitis changes                   COURSE & MEDICAL DECISION MAKING    ASSESSMENT, COURSE AND PLAN  Care Narrative: CT head to evaluate for subarachnoid hemorrhage.  CT max face to evaluate for facial infection. CBC to evaluate for acute anemia and leukocytosis.  CMP to evaluate for acute electrolyte  abnormality, acute kidney injury, acute liver failure or dysfunction.  Morphine and prochlorperazine for symptom control.     Electronically signed by: Caio Maldonado M.D., 12/3/2024 3:36 AM    I have been told by nursing staff the patient has elected to leave the hospital AGAINST MEDICAL ADVICE.  I was unable to see the patient before the patient walked out of the hospital.  Patient refused to sign AMA paperwork.  CT imaging demonstrates left maxillary sinusitis changes and possibly a left odontogenic infection.  Patient was requesting additional pain medicine despite morphine already having been given.     This dictation has been created using voice recognition software. I am continuously working with the software to minimize the number of voice recognition errors and I have made every attempt to manually correct the errors within my dictation. However errors  related to this voice recognition software may still exist and should be interpreted within the appropriate context.     Electronically signed by: Caio Maldonado M.D., 12/3/2024 4:10 AM      FINAL DIAGNOSIS  1. Maxillary sinusitis, unspecified chronicity    2. Facial pain         Electronically signed by: Caio Maldonado M.D., 12/3/2024 3:33 AM

## 2024-12-03 NOTE — LETTER
PHYSICIAN’S AND CHIROPRACTIC PHYSICIAN'S   PROGRESS REPORT   CERTIFICATION OF DISABILITY Claim Number:     Social Security Number: [id number]   Patient’s Name: Jax Yang Date of Injury:    Employer: [employer]  Name of MCO (if applicable):      Patient’s Job Description/Occupation:        Previous Injuries/Diseases/Surgeries Contributing to the Condition:         Diagnosis: (J32.0) Maxillary sinusitis, unspecified chronicity  (R51.9) Facial pain      Related to the Industrial Injury?       Explain:        Objective Medical Findings:           None - Discharged                         Stable                    Ratable           Generally Improved                         Condition Worsened                  Condition Same  May Have Suffered a Permanent Disability       Treatment Plan:              No Change in Therapy                  PT/OT Prescribed                      Medication May be Used While Working        Case Management                          PT/OT Discontinued    Consultation    Further Diagnostic Studies:    Prescription(s)                 Released to FULL DUTY /No Restrictions on (Date):       Certified TOTALLY TEMPORARILY DISABLED (Indicate Dates) From:   To:      Released to RESTRICTED/Modified Duty on (Date): From:   To:    Restrictions Are:         No Sitting    No Standing    No Pulling Other:         No Bending at Waist     No Stooping     No Lifting        No Carrying     No Walking Lifting Restricted to (lbs.):          No Pushing        No Climbing     No Reaching Above Shoulders       Date of Next Visit:    Date of this Exam: 12/3/2024 Physician/Chiropractic Physician Name:  Physician/Chiropractic Physician Signature:     [place]:  [address] [place] [address], [address] [place], [place] [address] - Telephone [phone] [place]:  [address] [facility], [address] [place], [place] [address] - Telephone [phone]    [email or web address]  D-39 (Rev. 10/24)

## 2024-12-03 NOTE — ED NOTES
Patient ambulated with steady gate to green 38. Patient assessed and hooked up to monitor. Chart up for ERP to see.

## 2024-12-03 NOTE — ED NOTES
Patient back from CT, provided with ice chips as ok per ERP. Patient requesting something stronger for pain medications. ERP notified.

## 2024-12-03 NOTE — ED NOTES
"Patient began yelling at this RN about his uncontrolled pain stating we \"didn't do anything for him.\" Patient provided with time period of when he got morphine and how long it takes to go into effect. Patient states \"it did nothing, I could have just taken advil at home.\" This RN explained that the results for the CT need to come back and then the ERP will come back in to re-eval him. Patient refusing to wait and states that \"I'll just go home a take a bunch of tylenol.\" This RN attempted to calm patient, patient still requesting to have PIV taken out and to go home.   "

## 2024-12-06 DIAGNOSIS — F41.9 ANXIETY AND DEPRESSION: ICD-10-CM

## 2024-12-06 DIAGNOSIS — I10 ESSENTIAL HYPERTENSION: ICD-10-CM

## 2024-12-06 DIAGNOSIS — F32.A ANXIETY AND DEPRESSION: ICD-10-CM

## 2024-12-09 NOTE — TELEPHONE ENCOUNTER
Received request via: Patient    Was the patient seen in the last year in this department? Yes    Does the patient have an active prescription (recently filled or refills available) for medication(s) requested? No    Pharmacy Name: Pharmacy:   [facility] - [place], [place] - [address]      Does the patient have FPC Plus and need 100-day supply? (This applies to ALL medications) Patient does not have SCP

## 2024-12-09 NOTE — TELEPHONE ENCOUNTER
Received request via: Patient    Was the patient seen in the last year in this department? Yes    Does the patient have an active prescription (recently filled or refills available) for medication(s) requested? No    Pharmacy Name:   [facility] - [place], [place] - [address]      Does the patient have detention Plus and need 100-day supply? (This applies to ALL medications) Patient does not have SCP

## 2024-12-10 RX ORDER — LISINOPRIL 10 MG/1
10 TABLET ORAL DAILY
Qty: 90 TABLET | Refills: 0 | Status: SHIPPED | OUTPATIENT
Start: 2024-12-10

## 2024-12-10 RX ORDER — ESCITALOPRAM OXALATE 10 MG/1
10 TABLET ORAL DAILY
Qty: 90 TABLET | Refills: 0 | Status: SHIPPED | OUTPATIENT
Start: 2024-12-10

## 2025-01-17 ENCOUNTER — HOSPITAL ENCOUNTER (OUTPATIENT)
Facility: MEDICAL CENTER | Age: 40
End: 2025-01-17
Attending: SPECIALIST
Payer: COMMERCIAL

## 2025-01-17 PROCEDURE — 89321 SEMEN ANAL SPERM DETECTION: CPT

## 2025-01-21 LAB
SPECIMEN VOL SMN: 3.4 ML
SPERM P VAS SMN QL MICRO: NORMAL

## 2025-02-19 DIAGNOSIS — F41.9 ANXIETY AND DEPRESSION: ICD-10-CM

## 2025-02-19 DIAGNOSIS — F32.A ANXIETY AND DEPRESSION: ICD-10-CM

## 2025-02-19 DIAGNOSIS — I10 ESSENTIAL HYPERTENSION: ICD-10-CM

## 2025-02-19 RX ORDER — LISINOPRIL 10 MG/1
10 TABLET ORAL DAILY
Qty: 90 TABLET | Refills: 0 | Status: SHIPPED | OUTPATIENT
Start: 2025-02-19

## 2025-02-19 RX ORDER — ESCITALOPRAM OXALATE 10 MG/1
10 TABLET ORAL DAILY
Qty: 90 TABLET | Refills: 0 | Status: SHIPPED | OUTPATIENT
Start: 2025-02-19

## 2025-02-19 NOTE — TELEPHONE ENCOUNTER
Received request via: Pharmacy    Was the patient seen in the last year in this department? Yes    Does the patient have an active prescription (recently filled or refills available) for medication(s) requested? No    Pharmacy Name:    [facility] - [place], [place] - [address]  [address]  [facility] [address]  Phone: [phone] Fax: [phone]       Does the patient have FDC Plus and need 100-day supply? (This applies to ALL medications) Patient does not have SCP

## 2025-03-03 ENCOUNTER — OFFICE VISIT (OUTPATIENT)
Dept: MEDICAL GROUP | Facility: PHYSICIAN GROUP | Age: 40
End: 2025-03-03
Payer: COMMERCIAL

## 2025-03-03 VITALS
WEIGHT: 200 LBS | DIASTOLIC BLOOD PRESSURE: 68 MMHG | HEART RATE: 98 BPM | BODY MASS INDEX: 31.39 KG/M2 | TEMPERATURE: 97.5 F | SYSTOLIC BLOOD PRESSURE: 128 MMHG | HEIGHT: 67 IN | OXYGEN SATURATION: 100 % | RESPIRATION RATE: 20 BRPM

## 2025-03-03 DIAGNOSIS — F32.A ANXIETY AND DEPRESSION: ICD-10-CM

## 2025-03-03 DIAGNOSIS — R79.89 LOW TESTOSTERONE IN MALE: ICD-10-CM

## 2025-03-03 DIAGNOSIS — I10 ESSENTIAL HYPERTENSION: ICD-10-CM

## 2025-03-03 DIAGNOSIS — F33.42 RECURRENT MAJOR DEPRESSIVE DISORDER, IN FULL REMISSION (HCC): ICD-10-CM

## 2025-03-03 DIAGNOSIS — M54.42 CHRONIC LOW BACK PAIN WITH LEFT-SIDED SCIATICA, UNSPECIFIED BACK PAIN LATERALITY: ICD-10-CM

## 2025-03-03 DIAGNOSIS — E66.9 OBESITY (BMI 30-39.9): ICD-10-CM

## 2025-03-03 DIAGNOSIS — Z23 NEED FOR VACCINATION: ICD-10-CM

## 2025-03-03 DIAGNOSIS — F41.9 ANXIETY AND DEPRESSION: ICD-10-CM

## 2025-03-03 DIAGNOSIS — G89.29 CHRONIC LOW BACK PAIN WITH LEFT-SIDED SCIATICA, UNSPECIFIED BACK PAIN LATERALITY: ICD-10-CM

## 2025-03-03 DIAGNOSIS — F41.9 ANXIETY: ICD-10-CM

## 2025-03-03 PROBLEM — Z30.09 VASECTOMY EVALUATION: Status: RESOLVED | Noted: 2024-07-08 | Resolved: 2025-03-03

## 2025-03-03 PROBLEM — M54.50 CHRONIC LOW BACK PAIN: Status: ACTIVE | Noted: 2025-03-03

## 2025-03-03 PROCEDURE — 99395 PREV VISIT EST AGE 18-39: CPT | Mod: 25

## 2025-03-03 PROCEDURE — 3074F SYST BP LT 130 MM HG: CPT

## 2025-03-03 PROCEDURE — 90656 IIV3 VACC NO PRSV 0.5 ML IM: CPT

## 2025-03-03 PROCEDURE — 3078F DIAST BP <80 MM HG: CPT

## 2025-03-03 PROCEDURE — 90471 IMMUNIZATION ADMIN: CPT

## 2025-03-03 RX ORDER — LISINOPRIL 10 MG/1
10 TABLET ORAL DAILY
Qty: 90 TABLET | Refills: 0 | Status: SHIPPED | OUTPATIENT
Start: 2025-03-03

## 2025-03-03 RX ORDER — ESCITALOPRAM OXALATE 10 MG/1
10 TABLET ORAL DAILY
Qty: 90 TABLET | Refills: 3 | Status: SHIPPED | OUTPATIENT
Start: 2025-03-03

## 2025-03-03 ASSESSMENT — PATIENT HEALTH QUESTIONNAIRE - PHQ9: CLINICAL INTERPRETATION OF PHQ2 SCORE: 0

## 2025-03-03 ASSESSMENT — FIBROSIS 4 INDEX: FIB4 SCORE: 0.64

## 2025-03-03 ASSESSMENT — ENCOUNTER SYMPTOMS: BACK PAIN: 1

## 2025-03-03 NOTE — ASSESSMENT & PLAN NOTE
Chronic, improved. Saw velia for this, received steroid injection 1/14/25 which alleviated discomfort and numbness which radiated to left foot.

## 2025-03-03 NOTE — PROGRESS NOTES
Verbal consent was acquired by the patient to use [employer] ambient listening note generation during this visit     Subjective:     CC: Diagnoses of Recurrent major depressive disorder, in full remission (HCC), Anxiety and depression, Anxiety, Essential hypertension, Low testosterone in male, Obesity (BMI 30-39.9), Need for vaccination, and Chronic low back pain with left-sided sciatica, unspecified back pain laterality were pertinent to this visit.    HPI:   Jax presents today with    History of Present Illness  The patient is a 39-year-old male who presents for evaluation of anxiety, hypertension, testosterone deficiency, and health maintenance.    He reports that his anxiety and depression are well-managed with his current medication regimen. He attempted to reduce his Lexapro dosage to 5 mg but experienced discomfort, leading him to revert to the 10 mg dosage, which he finds stabilizing.    His blood pressure is generally well-controlled with lisinopril, although he experienced a transient spike in blood pressure two weeks ago during an illness. Despite attempts at dietary modifications, he has been unable to achieve better control of his blood pressure. He reports no adverse effects from the lisinopril.    He is not currently on any steroid medications. His supplement regimen includes fish oil, probiotics, and vitamin B. He takes Metamucil sporadically, approximately a few times per week. He receives testosterone injections from our clinic on [facility], with blood tests conducted every three months under the supervision of Dr. Jang. His testosterone levels were previously very low, and he has noticed significant improvements in his sleep quality since starting the injections.    He maintains a healthy lifestyle, including regular exercise and a balanced diet. He consumes a variety of fresh fruits, vegetables, and meats daily, with a recent increase in fish intake. He limits his consumption of fast  food, junk food, and soda to once a week. He engages in [employer] training twice a week for an hour, walks his dogs, and plays with his two children. He reports no substance abuse or marital issues. He adheres to safety measures such as wearing a seatbelt and helmet when appropriate, ensuring his home is childproofed, and applying sunscreen when outdoors. He has a regular dentist and reports no new health concerns. He has received two doses of the COVID-19 vaccine and is due for his influenza vaccine. He typically receives his tetanus vaccine at work.    Supplemental Information  He underwent a vasectomy, which he found unpleasant. He reports no bleeding or hemorrhoid issues since starting Metamucil. He recently received a back injection for a herniated disc, which has significantly alleviated his pain. He uses three different-sized roller wheels to massage his back a few times a week, which he finds beneficial. He had a CT scan of his head following a work-related injury in 2019, which revealed an infected tooth. He subsequently underwent a root canal procedure.    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 He reports no substance abuse.    ALLERGIES  The patient is allergic to TACROLIMUS, which causes itching and a hot sensation.    MEDICATIONS  Current: Lisinopril, fish oil, probiotics, Metamucil, vitamin B, testosterone injections, Lexapro    IMMUNIZATIONS  He has received the first two doses of the COVID-19 vaccine. He is due for his influenza vaccine. Tetanus vaccine is not due until 2029.    Current Outpatient Medications   Medication Sig Dispense Refill    escitalopram (LEXAPRO) 10 MG Tab Take 1 Tablet by mouth every day. 90 Tablet 3    lisinopril (PRINIVIL) 10 MG Tab Take 1 Tablet by mouth every day. 90 Tablet 0    testosterone cypionate (DEPO-TESTOSTERONE) 200 MG/ML injection INJECT 0.6 IN THE MUSCLE EVERY 7 DAYS      Omega-3 Fatty Acids (FISH OIL) 1000 MG Cap capsule Take 1,000 mg by  "mouth 3 times a day with meals.      vitamin D3 (CHOLECALCIFEROL) 1000 Unit (25 mcg) Tab Take 1,000 Units by mouth every day.      Probiotic Product (PROBIOTIC BLEND PO) Take  by mouth.      psyllium (METAMUCIL) 58.12 % Pack Take 1 Packet by mouth every day.      cyanocobalamin (VITAMIN B-12) 500 MCG Tab Take 500 mcg by mouth every day.       No current facility-administered medications for this visit.       Problem   Chronic Low Back Pain With Left-Sided Sciatica   Low Testosterone in Male   Anxiety   Obesity (Bmi 30-39.9)   Recurrent Major Depressive Disorder, in Full Remission (Hcc)   Essential Hypertension   Vasectomy Evaluation (Resolved)       Health Maintenance: Completed  Infectious disease screening/Immunizaitons  -STI screening: declines  Practices safe sex.    -Immunizaitons:   Influenza: 3/3/25  Tetanus: up-to-date: 9/9/29  Anticipatory Guidance:  Elicits he is eating a variety of fresh veggies/fruits, variety meats, mostly lean  limited fast food/junk food/soda  Exercise: recommended 150 minutes/week; jiu jitsu 2 days weekly 60 min/chasing children/walking dogs.  Substance Abuse: n/a  Safe in relationship. Yes/  Seat belts, bike helmet, gun safety discussed.  Sun protection used.  Dental home established    ROS:  Review of Systems   Musculoskeletal:  Positive for back pain (improved after injection).   All other systems reviewed and are negative.      Objective:     Exam:  /68 (BP Location: Right arm, Patient Position: Sitting, BP Cuff Size: Adult long)   Pulse 98   Temp 36.4 °C (97.5 °F) (Temporal)   Resp 20   Ht 1.702 m (5' 7\")   Wt 90.7 kg (200 lb)   SpO2 100%   BMI 31.32 kg/m²  Body mass index is 31.32 kg/m².    Physical Exam  Vitals reviewed.   Constitutional:       General: He is not in acute distress.     Appearance: Normal appearance. He is obese. He is not ill-appearing.   HENT:      Head: Normocephalic and atraumatic.      Right Ear: Tympanic membrane, ear canal and " external ear normal.      Left Ear: Tympanic membrane, ear canal and external ear normal.      Nose: Nose normal.      Mouth/Throat:      Mouth: Mucous membranes are moist.      Pharynx: Oropharynx is clear.   Eyes:      Extraocular Movements: Extraocular movements intact.      Conjunctiva/sclera: Conjunctivae normal.      Pupils: Pupils are equal, round, and reactive to light.   Neck:      Thyroid: No thyromegaly.      Trachea: Trachea normal.   Cardiovascular:      Rate and Rhythm: Normal rate and regular rhythm.      Pulses: Normal pulses.      Heart sounds: Normal heart sounds. No murmur heard.  Pulmonary:      Effort: Pulmonary effort is normal. No respiratory distress.      Breath sounds: Normal breath sounds. No wheezing.   Abdominal:      General: Bowel sounds are normal.      Palpations: Abdomen is soft.   Musculoskeletal:         General: No swelling, tenderness or deformity. Normal range of motion.      Cervical back: Full passive range of motion without pain.   Lymphadenopathy:      Cervical: No cervical adenopathy.   Skin:     General: Skin is warm and dry.      Capillary Refill: Capillary refill takes less than 2 seconds.   Neurological:      General: No focal deficit present.      Mental Status: He is alert and oriented to person, place, and time.      Cranial Nerves: No cranial nerve deficit.      Sensory: No sensory deficit.      Motor: No weakness.   Psychiatric:         Mood and Affect: Mood normal.         Behavior: Behavior normal.       Assessment & Plan:     39 y.o. male with the following -     Assessment & Plan  1. Anxiety.  His anxiety symptoms are well controlled on the current dose of Lexapro 10 mg. He attempted to reduce the dose to 5 mg but did not tolerate it well. Lexapro 10 mg will be refilled.    2. Hypertension.  His blood pressure is well managed with lisinopril. He experienced a spike in blood pressure two weeks ago, likely due to illness, but it has since normalized. Lisinopril  will be refilled.    3. Testosterone deficiency.  He is receiving weekly [facility] and undergoes blood tests every three months. He reports improved sleep quality since starting testosterone therapy.    4. Health maintenance.  He will receive the influenza vaccine today. He is advised to continue his current exercise routine, including jiu-jitsu twice a week and core strengthening exercises to prevent recurrence of back pain.    Follow-up  The patient will follow up in 1 year or sooner if any issues arise.    PROCEDURE  The patient underwent a vasectomy in the past. He recently received a back injection for a herniated disc, which has significantly alleviated his pain. The patient also underwent a root canal procedure following a CT scan of his head that revealed an infected tooth.    Problem List Items Addressed This Visit          Family Medicine Problems    Essential hypertension    Chronic, stable. Bp in clinic today 128/68. Continue lisinopril 10 mg daily.         Relevant Medications    lisinopril (PRINIVIL) 10 MG Tab    Chronic low back pain with left-sided sciatica    Chronic, improved. Saw velia for this, received steroid injection 1/14/25 which alleviated discomfort and numbness which radiated to left foot.         Relevant Medications    escitalopram (LEXAPRO) 10 MG Tab       Other    Recurrent major depressive disorder, in full remission (HCC)    Chronic, stable. Reports symptoms well controlled with lexapro 10 mg daily. Continue this         Relevant Medications    escitalopram (LEXAPRO) 10 MG Tab    Low testosterone in male    Chronic, stable. Getting from [facility] clinic. Continue testosterone weekly injections and f/u as scheduled         Anxiety    Chronic, stable. Continue lexapro 10 mg daily.         Relevant Medications    escitalopram (LEXAPRO) 10 MG Tab    Obesity (BMI 30-39.9)    Chronic, stable. Discussed healthy lifestyle recommendations.              Other Visit Diagnoses         Anxiety and depression        Relevant Medications    escitalopram (LEXAPRO) 10 MG Tab      Need for vaccination        Relevant Orders    INFLUENZA VACCINE TRI INJ (PF) (Completed)          Patient was educated in proper administration of medication(s) ordered today including safety, possible SE, risks, benefits, rationale and alternatives to therapy.   Supportive care, differential diagnoses, and indications for immediate follow-up discussed with patient.    Pathogenesis of diagnosis discussed including typical length and natural progression.    Instructed to return to clinic or nearest emergency department for any change in condition, further concerns, or worsening of symptoms.  Patient states understanding of the plan of care and discharge instructions.    Return in about 1 year (around 3/3/2026).    Please note that this dictation was created using voice recognition software. I have made every reasonable attempt to correct obvious errors, but I expect that there are errors of grammar and possibly content that I did not discover before finalizing the note.

## 2025-03-03 NOTE — ASSESSMENT & PLAN NOTE
Chronic, stable. Getting from [facility] clinic. Continue testosterone weekly injections and f/u as scheduled

## 2025-07-23 DIAGNOSIS — I10 ESSENTIAL HYPERTENSION: ICD-10-CM

## 2025-07-23 NOTE — TELEPHONE ENCOUNTER
Received request via: Patient    Was the patient seen in the last year in this department? Yes    Does the patient have an active prescription (recently filled or refills available) for medication(s) requested? No    Pharmacy Name:   [email or web address] - [place], [place] - [address]  [address]  [facility] [address]  Phone: [phone] Fax: [phone]      Does the patient have long term Plus and need 100-day supply? (This applies to ALL medications) Patient does not have SCP

## 2025-07-24 RX ORDER — LISINOPRIL 10 MG/1
10 TABLET ORAL DAILY
Qty: 90 TABLET | Refills: 3 | Status: SHIPPED | OUTPATIENT
Start: 2025-07-24

## 2026-03-03 ENCOUNTER — APPOINTMENT (OUTPATIENT)
Dept: MEDICAL GROUP | Facility: PHYSICIAN GROUP | Age: 41
End: 2026-03-03
Payer: COMMERCIAL

## (undated) DEVICE — DRESSING EAR GLASSCOCK ADULT (6EA/BX)

## (undated) DEVICE — SUCTION INSTRUMENT YANKAUER BULBOUS TIP W/O VENT (50EA/CA)

## (undated) DEVICE — GLOVE BIOGEL SZ 6 PF LATEX - (50EA/BX 4BX/CA)

## (undated) DEVICE — SUTURE 3-0 CHROMIC GUT FS-2 27 (36PK/BX)"

## (undated) DEVICE — SYRINGE SAFETY TB 1 ML 27 GA X 1/2 IN (100/BX 4BX/CA)

## (undated) DEVICE — SUTURE 4-0 ETHILON FS-1 18 (36PK/BX)"

## (undated) DEVICE — ELECTRODE DUAL RETURN W/ CORD - (50/PK)

## (undated) DEVICE — DRAPE STRLE REG TOWEL 18X24 - (10/BX 4BX/CA)"

## (undated) DEVICE — KIT ANESTHESIA W/CIRCUIT & 3/LT BAG W/FILTER (20EA/CA)

## (undated) DEVICE — DRAPE 36X28IN RAD CARM BND BG - (25/CA) O

## (undated) DEVICE — GLOVE SZ 7.5 BIOGEL PI MICRO - PF LF (50PR/BX)

## (undated) DEVICE — SET LEADWIRE 5 LEAD BEDSIDE DISPOSABLE ECG (1SET OF 5/EA)

## (undated) DEVICE — CANISTER SUCTION 3000ML MECHANICAL FILTER AUTO SHUTOFF MEDI-VAC NONSTERILE LF DISP  (40EA/CA)

## (undated) DEVICE — TRAY SRGPRP PVP IOD WT PRP - (20/CA)

## (undated) DEVICE — Device

## (undated) DEVICE — CIRCUIT VENTILATOR PEDIATRIC WITH FILTER  (20EA/CS)

## (undated) DEVICE — TUBING CLEARLINK DUO-VENT - C-FLO (48EA/CA)

## (undated) DEVICE — SUTURE 5-0 PROLENE P-3 - (12/BX)

## (undated) DEVICE — GOWN SURGEONS X-LARGE - DISP. (30/CA)

## (undated) DEVICE — SYRINGE NON SAFETY 10 CC 20 GA X 1-1/2 IN (100/BX 4BX/CA)

## (undated) DEVICE — SPONGE GAUZESTER 4 X 4 4PLY - (128PK/CA)

## (undated) DEVICE — SET EXTENSION WITH 2 PORTS (48EA/CA) ***PART #2C8610 IS A SUBSTITUTE*****

## (undated) DEVICE — DRAPE LG. APERTURE 33 X 51" - (10EA/BX)"

## (undated) DEVICE — WIPE INSTRUMENT MICROWIPE (20EA/SP)

## (undated) DEVICE — WATER IRRIGATION STERILE 1000ML (12EA/CA)

## (undated) DEVICE — SUTURE GENERAL

## (undated) DEVICE — NEPTUNE 4 PORT MANIFOLD - (20/PK)

## (undated) DEVICE — DISH PETRI STERILE (50EA/CA)

## (undated) DEVICE — KIT  I.V. START (100EA/CA)

## (undated) DEVICE — SYRINGE NON SAFETY TB 1 CC 27 GA X 1/2 IN (100/BX 8BX/CA)

## (undated) DEVICE — MASK ANESTHESIA ADULT  - (100/CA)

## (undated) DEVICE — DRAPE MAYO STAND - (30/CA)

## (undated) DEVICE — MEDICINE CUP STERILE 2 OZ - (100/CA)

## (undated) DEVICE — SHEATH RO 4F 10CM (10EA/BX)

## (undated) DEVICE — ADHESIVE MASTISOL - (48/BX)

## (undated) DEVICE — GLOVE BIOGEL PI ORTHO SZ 8.5 PF LF (40/BX)

## (undated) DEVICE — PACK ENT OR - (2EA/CA)

## (undated) DEVICE — DRAIN PENROSE STERILE 1/4 X - 18 IN  (25EA/BX)

## (undated) DEVICE — SENSOR SPO2 NEO LNCS ADHESIVE (20/BX) SEE USER NOTES

## (undated) DEVICE — ELECTRODE 850 FOAM ADHESIVE - HYDROGEL RADIOTRNSPRNT (50/PK)

## (undated) DEVICE — DRESSING SURG 3 X 8 (OWENS) - (50/BX)

## (undated) DEVICE — SUTURE 4-0 VICRYL PLUS FS-2 - 27 INCH (36/BX)

## (undated) DEVICE — BANDAID SHEER STRIP 3/4 IN (100EA/BX 12BX/CA)

## (undated) DEVICE — ALCOHOL RUBBING 70% ISOPROPYL 16OZ - (12EA/BX)

## (undated) DEVICE — GOWN WARMING STANDARD FLEX - (30/CA)

## (undated) DEVICE — CATHETER IV 20 GA X 1-1/4 ---SURG.& SDS ONLY--- (50EA/BX)

## (undated) DEVICE — TUBE CONNECTING SUCTION - CLEAR PLASTIC STERILE 72 IN (50EA/CA)

## (undated) DEVICE — DRAPE SURGICAL U 77X120 - (10/CA)

## (undated) DEVICE — BUR ULTRA 4MM LONG 72MM - (5/PK)

## (undated) DEVICE — HEADREST SHEA

## (undated) DEVICE — GLOVESZ 8.5 BIOGEL PI MICRO - PF LF (50PR/BX)

## (undated) DEVICE — SYRINGE NON SAFETY 3 CC 21 GA X 1 1/2 IN (100/BX 8BX/CA)

## (undated) DEVICE — CATHETER IV 14 GA X 2 ---SURG.& SDS ONLY---(200EA/CA)

## (undated) DEVICE — DRAPE MICROSCOPE 46 X 80 - 10/CA

## (undated) DEVICE — SLEEVE VASO CALF MED - (10PR/CA)

## (undated) DEVICE — PENCIL ELECTSURG 10FT BTN SWH - (50/CA)

## (undated) DEVICE — BLADE SURGICAL #15 - (50/BX 3BX/CA)

## (undated) DEVICE — TUBING SET DRILL COOLANT (2EA/PK)

## (undated) DEVICE — CLEANER ELECTRO-SURGICAL TIP - (25/BX 4BX/CA)

## (undated) DEVICE — ANTI-FOG SOLUTION - 60BTL/CA

## (undated) DEVICE — BALL COTTON STERILE 5/PK - (5/PK 25PK/CA)

## (undated) DEVICE — PROTECTOR ULNA NERVE - (36PR/CA)

## (undated) DEVICE — TUBE CONNECT SUCTION CLEAR 120 X 1/4" (50EA/CA)"

## (undated) DEVICE — SUTURE 3-0 VICRYL PLUS RB-1 - (36/BX)

## (undated) DEVICE — NEEDLE NON SAFETY HYPO 22 GA X 1 1/2 IN (100/BX)

## (undated) DEVICE — SODIUM CHL IRRIGATION 0.9% 1000ML (12EA/CA)

## (undated) DEVICE — DRAPE LARGE 3 QUARTER - (20/CA)

## (undated) DEVICE — BUR FINE DIAMOND 3MM LONG 72MM

## (undated) DEVICE — DECANTER FLD BLS - (50/CA)

## (undated) DEVICE — LACTATED RINGERS INJ 1000 ML - (14EA/CA 60CA/PF)

## (undated) DEVICE — BLADE SURGICAL CLIPPER - (50EA/CA)

## (undated) DEVICE — BUR ULTRA 3MM LONG  72MM - (5/PK)

## (undated) DEVICE — SUTURE 4-0 ETHILON FS-2 18 (36PK/BX)"

## (undated) DEVICE — GLOVE BIOGEL SZ 8.5 SURGICAL PF LTX - (50PR/BX 4BX/CA)

## (undated) DEVICE — NEEDLE NON SAFETY 27GA X 1-1/4 IN HYPO (100EA/BX)

## (undated) DEVICE — LACTATED RINGERS INJ. 500 ML - (24EA/CA)

## (undated) DEVICE — WATER IRRIG. STER 3000 ML - (4/CA)

## (undated) DEVICE — SOLUTION, IODINE, SCRUB 10%

## (undated) DEVICE — CANISTER SUCTION RIGID RED 1500CC (40EA/CA)

## (undated) DEVICE — TUBE E-T HI-LO CUFF 8.0MM (10EA/PK)

## (undated) DEVICE — SUTURE 5-0 CHROMIC GUT PS-5 - (12/BX) 18 INCH

## (undated) DEVICE — HEAD HOLDER JUNIOR/ADULT

## (undated) DEVICE — TUBING SET IRRIG XOMED (NEW) - (4/PK)

## (undated) DEVICE — SURGIFOAM (SIZE 100) - (6EA/CA)

## (undated) DEVICE — SLEEVE, VASO, THIGH, MED

## (undated) DEVICE — BLADE BEAVER 6400 MINI EYE ROUND TIP SHARP ON ONE SIDE (20/CA)

## (undated) DEVICE — BUR FINE DIAM0ND 2MM LONG 72MM

## (undated) DEVICE — BLADE BEAVER 7200 (ENT) - 60 ANGLE (3EA/SP)

## (undated) DEVICE — SURGIFOAM (12X7) - (12EA/CA)

## (undated) DEVICE — STAPLER SKIN DISP - (6/BX 10BX/CA) VISISTAT